# Patient Record
Sex: FEMALE | Race: WHITE | Employment: FULL TIME | URBAN - METROPOLITAN AREA
[De-identification: names, ages, dates, MRNs, and addresses within clinical notes are randomized per-mention and may not be internally consistent; named-entity substitution may affect disease eponyms.]

---

## 2017-02-02 ENCOUNTER — ALLIED HEALTH/NURSE VISIT (OUTPATIENT)
Dept: FAMILY MEDICINE | Facility: OTHER | Age: 47
End: 2017-02-02

## 2017-02-02 VITALS — SYSTOLIC BLOOD PRESSURE: 126 MMHG | DIASTOLIC BLOOD PRESSURE: 80 MMHG | HEART RATE: 87 BPM

## 2017-02-02 DIAGNOSIS — Z01.30 BP CHECK: Primary | ICD-10-CM

## 2017-02-02 NOTE — NURSING NOTE
Lisa Urbina is a 46 year old female who comes in today for a Blood Pressure check because of ongoing blood pressure monitoring.    *Document pulse and BP  *Use new set of vitals button for multiple readings.  *Use extended vitals for orthostatic    Vitals as recorded, a regular cuff was used.    Patient is taking medication as prescribed  Patient is tolerating medications well.  Patient is not monitoring Blood Pressure at home.  Average readings if yes are NA    Current complaints: none - patient did state her ears feel like they are plugged/going to pop.    Disposition: follow-up as indicated by MD/AP

## 2017-02-28 NOTE — PROGRESS NOTES
SUBJECTIVE:                                                    Lisa Urbina is a 46 year old female who presents to clinic today for the following health issues:      History of Present Illness   Hypertension:     Outpatient blood pressures:  Are not being checked    Dietary sodium intake::  No added salt diet      HPI    Concern - Sciatic Nerve     Onset: Last few days    Description:   Sharp pain in sciatic region on right side. Bother's pt when sleeps    Intensity: severe, 7-8/10 @ worse    Progression of Symptoms:  worsening and intermittent    Accompanying Signs & Symptoms:  None       Previous history of similar problem:   Yes, has been seen for this in the past. Requesting a refill of Flexeril, but it okay with not having it if provider doesn't feel comfortable    Precipitating factors:   Worsened by: Laying down    Alleviating factors:  Improved by: Laying on back        Therapies Tried and outcome: Flexeril       Problem list and histories reviewed & adjusted, as indicated.  Additional history: as documented        Patient Active Problem List   Diagnosis     Hypertension goal BP (blood pressure) < 140/90     S/P laparoscopic assisted vaginal hysterectomy (LAVH)     Past Surgical History   Procedure Laterality Date     C thoracentesis,insrt chest tube,ptx       C echo heart xthoracic,complete, w/o doppler  2004     stress echo, normal     Tonsillectomy  05/08/2007     Laparoscopic assisted hysterectomy vaginal  10/14/2013     Procedure: LAPAROSCOPIC ASSISTED HYSTERECTOMY VAGINAL;  Laparoscopic Assisted VAginal Hysterectomy, cystoscopy.;  Surgeon: Elizabeth Arnold MD;  Location: [facility] OR     Cystoscopy  10/14/2013     Procedure: CYSTOSCOPY;;  Surgeon: Elizabeth Arnold MD;  Location: [facility] OR     Hysterectomy, pap no longer indicated         Social History   Substance Use Topics     Smoking status: Former Smoker     Types: Cigarettes     Quit date: 2/23/2012     Smokeless tobacco: Never Used     Alcohol  use Yes      Comment: weekends     Family History   Problem Relation Age of Onset     Hypertension Father      Hypertension Mother      HEART DISEASE Mother      DIABETES Maternal Grandfather      CEREBROVASCULAR DISEASE Maternal Aunt      4 strokes, 1st at age 36     Anesthesia Reaction No family hx of          Current Outpatient Prescriptions   Medication Sig Dispense Refill     lisinopril-hydrochlorothiazide (PRINZIDE/ZESTORETIC) 10-12.5 MG per tablet Take 1 tablet by mouth daily 90 tablet 3     metoprolol (TOPROL XL) 100 MG 24 hr tablet Take 1 tablet (100 mg) by mouth daily In the evening 90 tablet 3     diltiazem 240 MG 24 hr capsule Take 1 capsule (240 mg) by mouth daily 90 capsule 3     cyclobenzaprine (FLEXERIL) 10 MG tablet Take 0.5-1 tablets (5-10 mg) by mouth 3 times daily as needed for muscle spasms 30 tablet 0     [DISCONTINUED] lisinopril-hydrochlorothiazide (PRINZIDE,ZESTORETIC) 10-12.5 MG per tablet Take 1 tablet by mouth daily 90 tablet 3     [DISCONTINUED] metoprolol (TOPROL XL) 100 MG 24 hr tablet Take 1 tablet (100 mg) by mouth daily In the evening 90 tablet 3     [DISCONTINUED] diltiazem 240 MG 24 hr ER capsule Take 1 capsule (240 mg) by mouth daily 90 capsule 3     Allergies   Allergen Reactions     Nkda [No Known Drug Allergies]      BP Readings from Last 3 Encounters:   03/02/17 110/60   02/02/17 126/80   01/20/16 98/66    Wt Readings from Last 3 Encounters:   03/02/17 134 lb (60.8 kg)   01/20/16 146 lb (66.2 kg)   11/30/15 140 lb 6.4 oz (63.7 kg)                  Labs reviewed in EPIC    ROS:  Constitutional, HEENT, cardiovascular, pulmonary, gi and gu systems are negative, except as otherwise noted.    OBJECTIVE:                                                    /60 (BP Location: Left arm, Patient Position: Chair, Cuff Size: Adult Regular)  Pulse 80  Temp 98  F (36.7  C) (Oral)  Wt 134 lb (60.8 kg)  LMP 09/11/2013  BMI 23.18 kg/m2  Body mass index is 23.18 kg/(m^2).  Physical Exam    Constitutional: She is oriented to person, place, and time. She appears well-developed and well-nourished.   HENT:   Head: Normocephalic and atraumatic.   Cardiovascular: Normal rate and regular rhythm.    Pulmonary/Chest: Effort normal and breath sounds normal.   Musculoskeletal:   Neg straight leg rise . Normal Hip exam.No signs of myelopathy   Neurological: She is alert and oriented to person, place, and time.   Psychiatric: She has a normal mood and affect.         Diagnostic Test Results:  none      ASSESSMENT/PLAN:                                                      Problem List Items Addressed This Visit     Hypertension goal BP (blood pressure) < 140/90 - Primary     Blood pressure well controlled on Lisinopril -hctz, diltiazem and metoprolol  Recheck chemistries  Refills provided for 1 yr         Relevant Medications    lisinopril-hydrochlorothiazide (PRINZIDE/ZESTORETIC) 10-12.5 MG per tablet    metoprolol (TOPROL XL) 100 MG 24 hr tablet      Other Visit Diagnoses     Essential hypertension, benign        Relevant Medications    lisinopril-hydrochlorothiazide (PRINZIDE/ZESTORETIC) 10-12.5 MG per tablet    diltiazem 240 MG 24 hr capsule    Other Relevant Orders    Comprehensive metabolic panel (BMP + Alb, Alk Phos, ALT, AST, Total. Bili, TP)    Lipid Profile with reflex to direct LDL    Muscle spasm        Relevant Medications    cyclobenzaprine (FLEXERIL) 10 MG tablet       Back pain - sec to SI joint arthritis vs muscle spasm  Trial cyclobenzaprine  Anti ifnllamtory medications  Advised stretching and exercises   Discussed home care  Reportable signs and symptoms discussed  RTC if symptoms persist or fail to improve    Christen Hernandez MD  [facility]

## 2017-03-02 ENCOUNTER — OFFICE VISIT (OUTPATIENT)
Dept: FAMILY MEDICINE | Facility: OTHER | Age: 47
End: 2017-03-02
Payer: COMMERCIAL

## 2017-03-02 VITALS
DIASTOLIC BLOOD PRESSURE: 60 MMHG | TEMPERATURE: 98 F | WEIGHT: 134 LBS | BODY MASS INDEX: 23.18 KG/M2 | HEART RATE: 80 BPM | SYSTOLIC BLOOD PRESSURE: 110 MMHG

## 2017-03-02 DIAGNOSIS — I10 HYPERTENSION GOAL BP (BLOOD PRESSURE) < 140/90: Primary | ICD-10-CM

## 2017-03-02 DIAGNOSIS — M62.838 MUSCLE SPASM: ICD-10-CM

## 2017-03-02 DIAGNOSIS — I10 ESSENTIAL HYPERTENSION, BENIGN: ICD-10-CM

## 2017-03-02 PROCEDURE — 99214 OFFICE O/P EST MOD 30 MIN: CPT | Performed by: FAMILY MEDICINE

## 2017-03-02 RX ORDER — CYCLOBENZAPRINE HCL 10 MG
5-10 TABLET ORAL 3 TIMES DAILY PRN
Qty: 30 TABLET | Refills: 0 | Status: SHIPPED | OUTPATIENT
Start: 2017-03-02 | End: 2018-04-20

## 2017-03-02 RX ORDER — METOPROLOL SUCCINATE 100 MG/1
100 TABLET, EXTENDED RELEASE ORAL DAILY
Qty: 90 TABLET | Refills: 3 | Status: SHIPPED | OUTPATIENT
Start: 2017-03-02 | End: 2018-04-20

## 2017-03-02 RX ORDER — DILTIAZEM HYDROCHLORIDE 240 MG/1
240 CAPSULE, EXTENDED RELEASE ORAL DAILY
Qty: 90 CAPSULE | Refills: 3 | Status: SHIPPED | OUTPATIENT
Start: 2017-03-02 | End: 2018-04-20

## 2017-03-02 RX ORDER — LISINOPRIL/HYDROCHLOROTHIAZIDE 10-12.5 MG
1 TABLET ORAL DAILY
Qty: 90 TABLET | Refills: 3 | Status: SHIPPED | OUTPATIENT
Start: 2017-03-02 | End: 2018-04-20

## 2017-03-02 ASSESSMENT — PAIN SCALES - GENERAL
PAINLEVEL: MILD PAIN (2)
PAINLEVEL: MILD PAIN (2)

## 2017-03-02 NOTE — NURSING NOTE
"Chief Complaint   Patient presents with     Hypertension     Recheck Medication     Panel Management     height, tdap, flu, pap, bmp       Initial /60 (BP Location: Left arm, Patient Position: Chair, Cuff Size: Adult Regular)  Pulse 80  Temp 98  F (36.7  C) (Oral)  Wt 134 lb (60.8 kg)  LMP 09/11/2013  BMI 23.18 kg/m2 Estimated body mass index is 23.18 kg/(m^2) as calculated from the following:    Height as of 1/20/16: 5' 3.75\" (1.619 m).    Weight as of this encounter: 134 lb (60.8 kg).  Medication Reconciliation: complete   Tay Pereira MA  March 2, 2017      "

## 2017-03-02 NOTE — ASSESSMENT & PLAN NOTE
Blood pressure well controlled on Lisinopril -hctz, diltiazem and metoprolol  Recheck chemistries  Refills provided for 1 yr

## 2017-03-02 NOTE — MR AVS SNAPSHOT
After Visit Summary   3/2/2017    Lisa Urbina    MRN: [id number]           Patient Information     Date Of Birth          1970        Visit Information        Provider Department      3/2/2017 1:20 PM Christen Hernandez MD [facility]        Today's Diagnoses     Muscle spasm    -  1    Screening for malignant neoplasm of cervix        Need for prophylactic vaccination and inoculation against influenza        Need for prophylactic vaccination with tetanus-diphtheria (TD)        Hypertension goal BP (blood pressure) < 140/90        Essential hypertension, benign           Follow-ups after your visit        Who to contact     If you have questions or need follow up information about today's clinic visit or your schedule please contact [facility] directly at [phone].  Normal or non-critical lab and imaging results will be communicated to you by Tiltaphart, letter or phone within 4 business days after the clinic has received the results. If you do not hear from us within 7 days, please contact the clinic through Tiltaphart or phone. If you have a critical or abnormal lab result, we will notify you by phone as soon as possible.  Submit refill requests through [employer] or call your pharmacy and they will forward the refill request to us. Please allow 3 business days for your refill to be completed.          Additional Information About Your Visit        MyChart Information     [employer] gives you secure access to your electronic health record. If you see a primary care provider, you can also send messages to your care team and make appointments. If you have questions, please call your primary care clinic.  If you do not have a primary care provider, please call [phone] and they will assist you.        Care EveryWhere ID     This is your Care EveryWhere ID. This could be used by other organizations to access your [place] medical records  [id number]        Your  Vitals Were     Pulse Temperature Last Period BMI (Body Mass Index)          80 98  F (36.7  C) (Oral) 09/11/2013 23.18 kg/m2         Blood Pressure from Last 3 Encounters:   03/02/17 110/60   02/02/17 126/80   01/20/16 98/66    Weight from Last 3 Encounters:   03/02/17 134 lb (60.8 kg)   01/20/16 146 lb (66.2 kg)   11/30/15 140 lb 6.4 oz (63.7 kg)              We Performed the Following     Comprehensive metabolic panel (BMP + Alb, Alk Phos, ALT, AST, Total. Bili, TP)     Lipid Profile with reflex to direct LDL          Today's Medication Changes          These changes are accurate as of: 3/2/17  2:01 PM.  If you have any questions, ask your nurse or doctor.               Start taking these medicines.        Dose/Directions    cyclobenzaprine 10 MG tablet   Commonly known as:  FLEXERIL   Used for:  Muscle spasm   Started by:  Christen Hernandez MD        Dose:  5-10 mg   Take 0.5-1 tablets (5-10 mg) by mouth 3 times daily as needed for muscle spasms   Quantity:  30 tablet   Refills:  0            Where to get your medicines      These medications were sent to [facility] [address]  [address] [facility] [address]     Phone:  [phone]     cyclobenzaprine 10 MG tablet    diltiazem 240 MG 24 hr capsule    lisinopril-hydrochlorothiazide 10-12.5 MG per tablet    metoprolol 100 MG 24 hr tablet                Primary Care Provider Office Phone # Fax #    Any Perez [phone]       [facility] [address] [facility] [address]        Thank you!     Thank you for choosing [facility]  for your care. Our goal is always to provide you with excellent care. Hearing back from our patients is one way we can continue to improve our services. Please take a few minutes to complete the written survey that you may receive in the mail after your visit with us. Thank you!             Your Updated Medication List - Protect others around you:  Learn how to safely use, store and throw away your medicines at www.disposemymeds.org.          This list is accurate as of: 3/2/17  2:01 PM.  Always use your most recent med list.                   Brand Name Dispense Instructions for use    cyclobenzaprine 10 MG tablet    FLEXERIL    30 tablet    Take 0.5-1 tablets (5-10 mg) by mouth 3 times daily as needed for muscle spasms       diltiazem 240 MG 24 hr capsule     90 capsule    Take 1 capsule (240 mg) by mouth daily       lisinopril-hydrochlorothiazide 10-12.5 MG per tablet    PRINZIDE/ZESTORETIC    90 tablet    Take 1 tablet by mouth daily       metoprolol 100 MG 24 hr tablet    TOPROL XL    90 tablet    Take 1 tablet (100 mg) by mouth daily In the evening

## 2017-03-07 DIAGNOSIS — I10 ESSENTIAL HYPERTENSION, BENIGN: ICD-10-CM

## 2017-03-07 LAB
ALBUMIN SERPL-MCNC: 3.6 G/DL (ref 3.4–5)
ALP SERPL-CCNC: 127 U/L (ref 40–150)
ALT SERPL W P-5'-P-CCNC: 20 U/L (ref 0–50)
ANION GAP SERPL CALCULATED.3IONS-SCNC: 5 MMOL/L (ref 3–14)
AST SERPL W P-5'-P-CCNC: 18 U/L (ref 0–45)
BILIRUB SERPL-MCNC: 0.2 MG/DL (ref 0.2–1.3)
BUN SERPL-MCNC: 12 MG/DL (ref 7–30)
CALCIUM SERPL-MCNC: 8.9 MG/DL (ref 8.5–10.1)
CHLORIDE SERPL-SCNC: 104 MMOL/L (ref 94–109)
CHOLEST SERPL-MCNC: 174 MG/DL
CO2 SERPL-SCNC: 32 MMOL/L (ref 20–32)
CREAT SERPL-MCNC: 0.66 MG/DL (ref 0.52–1.04)
GFR SERPL CREATININE-BSD FRML MDRD: ABNORMAL ML/MIN/1.7M2
GLUCOSE SERPL-MCNC: 100 MG/DL (ref 70–99)
HDLC SERPL-MCNC: 64 MG/DL
LDLC SERPL CALC-MCNC: 88 MG/DL
NONHDLC SERPL-MCNC: 110 MG/DL
POTASSIUM SERPL-SCNC: 3.7 MMOL/L (ref 3.4–5.3)
PROT SERPL-MCNC: 7.2 G/DL (ref 6.8–8.8)
SODIUM SERPL-SCNC: 141 MMOL/L (ref 133–144)
TRIGL SERPL-MCNC: 112 MG/DL

## 2017-03-07 PROCEDURE — 36415 COLL VENOUS BLD VENIPUNCTURE: CPT | Performed by: FAMILY MEDICINE

## 2017-03-07 PROCEDURE — 80061 LIPID PANEL: CPT | Performed by: FAMILY MEDICINE

## 2017-03-07 PROCEDURE — 80053 COMPREHEN METABOLIC PANEL: CPT | Performed by: FAMILY MEDICINE

## 2017-06-07 ENCOUNTER — RADIANT APPOINTMENT (OUTPATIENT)
Dept: MAMMOGRAPHY | Facility: OTHER | Age: 47
End: 2017-06-07
Attending: FAMILY MEDICINE
Payer: COMMERCIAL

## 2017-06-07 DIAGNOSIS — Z12.31 VISIT FOR SCREENING MAMMOGRAM: ICD-10-CM

## 2017-06-07 PROCEDURE — G0202 SCR MAMMO BI INCL CAD: HCPCS | Mod: TC

## 2017-06-08 ENCOUNTER — TELEPHONE (OUTPATIENT)
Dept: FAMILY MEDICINE | Facility: OTHER | Age: 47
End: 2017-06-08

## 2017-06-08 NOTE — TELEPHONE ENCOUNTER
----- Message from Christen Hernandez MD sent at 6/8/2017  6:43 PM CDT -----  Mammogram did not show any suspicious lesions

## 2017-12-20 ENCOUNTER — ALLIED HEALTH/NURSE VISIT (OUTPATIENT)
Dept: FAMILY MEDICINE | Facility: OTHER | Age: 47
End: 2017-12-20
Payer: COMMERCIAL

## 2017-12-20 VITALS — SYSTOLIC BLOOD PRESSURE: 122 MMHG | DIASTOLIC BLOOD PRESSURE: 80 MMHG | HEART RATE: 66 BPM

## 2017-12-20 DIAGNOSIS — Z01.30 BP CHECK: Primary | ICD-10-CM

## 2017-12-20 PROCEDURE — 99207 ZZC NO CHARGE NURSE ONLY: CPT

## 2017-12-20 NOTE — MR AVS SNAPSHOT
After Visit Summary   12/20/2017    Lisa Urbina    MRN: [id number]           Patient Information     Date Of Birth          1970        Visit Information        Provider Department      12/20/2017 2:00 PM JULIO GALEANO TEAM B, [facility]        Today's Diagnoses     BP check    -  1       Follow-ups after your visit        Your next 10 appointments already scheduled     Dec 20, 2017  2:00 PM CST   Nurse Only with NL WALESKA TEAM B, [facility] ([facility])    [address] [facility] [address]  [facility] [id number] [phone]              Who to contact     If you have questions or need follow up information about today's clinic visit or your schedule please contact [facility] directly at [phone].  Normal or non-critical lab and imaging results will be communicated to you by Snootlabhart, letter or phone within 4 business days after the clinic has received the results. If you do not hear from us within 7 days, please contact the clinic through Snootlabhart or phone. If you have a critical or abnormal lab result, we will notify you by phone as soon as possible.  Submit refill requests through Asp[employer] or call your pharmacy and they will forward the refill request to us. Please allow 3 business days for your refill to be completed.          Additional Information About Your Visit        MyChart Information     Asp[employer]ra gives you secure access to your electronic health record. If you see a primary care provider, you can also send messages to your care team and make appointments. If you have questions, please call your primary care clinic.  If you do not have a primary care provider, please call [phone] and they will assist you.        Care EveryWhere ID     This is your Care EveryWhere ID. This could be used by other organizations to access your [place] medical records  [id number]        Your Vitals Were     Pulse Last Period                 66 09/11/2013           Blood Pressure from Last 3 Encounters:   12/20/17 122/80   03/02/17 110/60   02/02/17 126/80    Weight from Last 3 Encounters:   03/02/17 134 lb (60.8 kg)   01/20/16 146 lb (66.2 kg)   11/30/15 140 lb 6.4 oz (63.7 kg)              Today, you had the following     No orders found for display       Primary Care Provider Office Phone # Fax #    Any Perez [phone]       [address] [facility] [address]        Equal Access to Services     [facility]: Hadii valentina sparrow [place], [place], qasariah kaalmada agustin, corby shukla . So [facility] [phone].    ATENCIÓN: Si habla español, tiene a connell disposición servicios gratuitos de asistencia lingüística. Llame al [phone].    We comply with applicable federal civil rights laws and [place] laws. We do not discriminate on the basis of race, color, national origin, age, disability, sex, sexual orientation, or gender identity.            Thank you!     Thank you for choosing [facility]  for your care. Our goal is always to provide you with excellent care. Hearing back from our patients is one way we can continue to improve our services. Please take a few minutes to complete the written survey that you may receive in the mail after your visit with us. Thank you!             Your Updated Medication List - Protect others around you: Learn how to safely use, store and throw away your medicines at www.disposemymeds.org.          This list is accurate as of: 12/20/17  9:27 AM.  Always use your most recent med list.                   Brand Name Dispense Instructions for use Diagnosis    cyclobenzaprine 10 MG tablet    FLEXERIL    30 tablet    Take 0.5-1 tablets (5-10 mg) by mouth 3 times daily as needed for muscle spasms    Muscle spasm       diltiazem 240 MG 24 hr capsule     90 capsule    Take 1 capsule (240 mg) by mouth daily    Essential hypertension, benign        lisinopril-hydrochlorothiazide 10-12.5 MG per tablet    PRINZIDE/ZESTORETIC    90 tablet    Take 1 tablet by mouth daily    Hypertension goal BP (blood pressure) < 140/90       metoprolol 100 MG 24 hr tablet    TOPROL XL    90 tablet    Take 1 tablet (100 mg) by mouth daily In the evening    Hypertension goal BP (blood pressure) < 140/90

## 2017-12-20 NOTE — NURSING NOTE
Lisa Urbina is a 47 year old female who comes in today for a Blood Pressure check because of ongoing blood pressure monitoring.    *Document pulse and BP  *Use new set of vitals button for multiple readings.  *Use extended vitals for orthostatic    Vitals as recorded, a regular cuff was used.    Patient is taking medication as prescribed  Patient is tolerating medications well.  Patient is not monitoring Blood Pressure at home.      Current complaints: headaches    Disposition: follow-up as indicated by MD/AP    Huddled with RN due to symptom of headache and they will follow up with patient.

## 2018-04-18 ASSESSMENT — ENCOUNTER SYMPTOMS
FREQUENCY: 0
EYE PAIN: 0
CONSTIPATION: 0
CHILLS: 0
HEADACHES: 0
NERVOUS/ANXIOUS: 0
SHORTNESS OF BREATH: 0
HEARTBURN: 0
MYALGIAS: 0
DIARRHEA: 0
DIZZINESS: 0
ARTHRALGIAS: 0
HEMATURIA: 0
FEVER: 0
SORE THROAT: 0
COUGH: 0
ABDOMINAL PAIN: 0
JOINT SWELLING: 0
DYSURIA: 0
HEMATOCHEZIA: 0
WEAKNESS: 0
NAUSEA: 0
PALPITATIONS: 0
PARESTHESIAS: 0

## 2018-04-18 NOTE — PATIENT INSTRUCTIONS
Preventive Health Recommendations  Female Ages 40 to 49    Yearly exam:     See your health care provider every year in order to  1. Review health changes.   2. Discuss preventive care.    3. Review your medicines if your doctor prescribed any.      Get a Pap test every three years (unless you have an abnormal result and your provider advises testing more often).      If you get Pap tests with HPV test, you only need to test every 5 years, unless you have an abnormal result. You do not need a Pap test if your uterus was removed (hysterectomy) and you have not had cancer.      You should be tested each year for STDs (sexually transmitted diseases), if you're at risk.       Ask your doctor if you should have a mammogram.      Have a colonoscopy (test for colon cancer) if someone in your family has had colon cancer or polyps before age 50.       Have a cholesterol test every 5 years.       Have a diabetes test (fasting glucose) after age 45. If you are at risk for diabetes, you should have this test every 3 years.    Shots: Get a flu shot each year. Get a tetanus shot every 10 years.     Nutrition:     Eat at least 5 servings of fruits and vegetables each day.    Eat whole-grain bread, whole-wheat pasta and brown rice instead of white grains and rice.    Talk to your provider about Calcium and Vitamin D.     Lifestyle    Exercise at least 150 minutes a week (an average of 30 minutes a day, 5 days a week). This will help you control your weight and prevent disease.    Limit alcohol to one drink per day.    No smoking.     Wear sunscreen to prevent skin cancer.    See your dentist every six months for an exam and cleaning.    Please follow up with [facility] and mammo.     I will my chart you  Result    Thank you  Staci Bustos CNP
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
## 2018-04-20 ENCOUNTER — OFFICE VISIT (OUTPATIENT)
Dept: FAMILY MEDICINE | Facility: OTHER | Age: 48
End: 2018-04-20
Payer: COMMERCIAL

## 2018-04-20 VITALS
WEIGHT: 136 LBS | DIASTOLIC BLOOD PRESSURE: 72 MMHG | HEIGHT: 64 IN | SYSTOLIC BLOOD PRESSURE: 120 MMHG | HEART RATE: 72 BPM | TEMPERATURE: 98.1 F | RESPIRATION RATE: 16 BRPM | BODY MASS INDEX: 23.22 KG/M2

## 2018-04-20 DIAGNOSIS — Z01.00 VISIT FOR EYE AND VISION EXAM: ICD-10-CM

## 2018-04-20 DIAGNOSIS — Z13.220 NEED FOR LIPID SCREENING: ICD-10-CM

## 2018-04-20 DIAGNOSIS — Z23 NEED FOR VACCINATION: ICD-10-CM

## 2018-04-20 DIAGNOSIS — I10 ESSENTIAL HYPERTENSION, BENIGN: ICD-10-CM

## 2018-04-20 DIAGNOSIS — Z00.00 ROUTINE GENERAL MEDICAL EXAMINATION AT A HEALTH CARE FACILITY: Primary | ICD-10-CM

## 2018-04-20 DIAGNOSIS — M62.838 MUSCLE SPASM: ICD-10-CM

## 2018-04-20 LAB
CHOLEST SERPL-MCNC: 186 MG/DL
HDLC SERPL-MCNC: 55 MG/DL
LDLC SERPL CALC-MCNC: 99 MG/DL
NONHDLC SERPL-MCNC: 131 MG/DL
TRIGL SERPL-MCNC: 160 MG/DL

## 2018-04-20 PROCEDURE — 80061 LIPID PANEL: CPT | Performed by: NURSE PRACTITIONER

## 2018-04-20 PROCEDURE — 99396 PREV VISIT EST AGE 40-64: CPT | Performed by: NURSE PRACTITIONER

## 2018-04-20 PROCEDURE — 36415 COLL VENOUS BLD VENIPUNCTURE: CPT | Performed by: NURSE PRACTITIONER

## 2018-04-20 RX ORDER — METOPROLOL SUCCINATE 100 MG/1
100 TABLET, EXTENDED RELEASE ORAL DAILY
Qty: 90 TABLET | Refills: 3 | Status: SHIPPED | OUTPATIENT
Start: 2018-04-20 | End: 2019-03-22

## 2018-04-20 RX ORDER — DILTIAZEM HYDROCHLORIDE 240 MG/1
240 CAPSULE, EXTENDED RELEASE ORAL DAILY
Qty: 90 CAPSULE | Refills: 3 | Status: SHIPPED | OUTPATIENT
Start: 2018-04-20 | End: 2019-03-22

## 2018-04-20 RX ORDER — CYCLOBENZAPRINE HCL 10 MG
5-10 TABLET ORAL 3 TIMES DAILY PRN
Qty: 30 TABLET | Refills: 0 | Status: SHIPPED | OUTPATIENT
Start: 2018-04-20 | End: 2020-07-13

## 2018-04-20 RX ORDER — LISINOPRIL/HYDROCHLOROTHIAZIDE 10-12.5 MG
1 TABLET ORAL DAILY
Qty: 90 TABLET | Refills: 3 | Status: SHIPPED | OUTPATIENT
Start: 2018-04-20 | End: 2019-03-22

## 2018-04-20 ASSESSMENT — ENCOUNTER SYMPTOMS
SHORTNESS OF BREATH: 0
SORE THROAT: 0
JOINT SWELLING: 0
PALPITATIONS: 0
CONSTIPATION: 0
FREQUENCY: 0
EYE PAIN: 0
NAUSEA: 0
MYALGIAS: 0
DIZZINESS: 0
PARESTHESIAS: 0
WEAKNESS: 0
HEARTBURN: 0
CHILLS: 0
ARTHRALGIAS: 0
DIARRHEA: 0
COUGH: 0
HEADACHES: 0
FEVER: 0
HEMATOCHEZIA: 0
HEMATURIA: 0
NERVOUS/ANXIOUS: 0
ABDOMINAL PAIN: 0
DYSURIA: 0

## 2018-04-20 NOTE — MR AVS SNAPSHOT
After Visit Summary   4/20/2018    Lisa Urbina    MRN: [id number]           Patient Information     Date Of Birth          1970        Visit Information        Provider Department      4/20/2018 12:00 PM Staci Bustos APRN [facility]        Today's Diagnoses     Routine general medical examination at a health care facility    -  1    Essential hypertension        Muscle spasm        Essential hypertension, benign        Hypertension goal BP (blood pressure) < 140/90        Need for vaccination        Need for lipid screening        Visit for eye and vision exam          Care Instructions      Preventive Health Recommendations  Female Ages 40 to 49    Yearly exam:     See your health care provider every year in order to  1. Review health changes.   2. Discuss preventive care.    3. Review your medicines if your doctor prescribed any.      Get a Pap test every three years (unless you have an abnormal result and your provider advises testing more often).      If you get Pap tests with HPV test, you only need to test every 5 years, unless you have an abnormal result. You do not need a Pap test if your uterus was removed (hysterectomy) and you have not had cancer.      You should be tested each year for STDs (sexually transmitted diseases), if you're at risk.       Ask your doctor if you should have a mammogram.      Have a colonoscopy (test for colon cancer) if someone in your family has had colon cancer or polyps before age 50.       Have a cholesterol test every 5 years.       Have a diabetes test (fasting glucose) after age 45. If you are at risk for diabetes, you should have this test every 3 years.    Shots: Get a flu shot each year. Get a tetanus shot every 10 years.     Nutrition:     Eat at least 5 servings of fruits and vegetables each day.    Eat whole-grain bread, whole-wheat pasta and brown rice instead of white grains and rice.    Talk to your  provider about Calcium and Vitamin D.     Lifestyle    Exercise at least 150 minutes a week (an average of 30 minutes a day, 5 days a week). This will help you control your weight and prevent disease.    Limit alcohol to one drink per day.    No smoking.     Wear sunscreen to prevent skin cancer.    See your dentist every six months for an exam and cleaning.    Please follow up with [facility].     I will my chart you  Result    Thank you  Staci Bustos CNP            Follow-ups after your visit        Additional Services     OPHTHALMOLOGY ADULT REFERRAL       Your provider has referred you to: N: [facility] - [place] [phone]  [email or web address]    Please be aware that coverage of these services is subject to the terms and limitations of your health insurance plan.  Call member services at your health plan with any benefit or coverage questions.      Please bring the following with you to your appointment:    (1) Any X-Rays, CTs or MRIs which have been performed.  Contact the facility where they were done to arrange for  prior to your scheduled appointment.    (2) List of current medications  (3) This referral request   (4) Any documents/labs given to you for this referral                  Your next 10 appointments already scheduled     Jun 14, 2018 11:45 AM CDT   (Arrive by 11:30 AM)   [facility] SCREENING DIG[facility] BIL[facility]AL [facility]    [address] [address] [phone]           Do not use any powder, lotion or deodorant under your arms or on your breast. If you do, we will ask you to remove it before your exam.  Wear comfortable, two-piece clothing.  If you have any allergies, tell your care team.  Bring any previous mammograms from other facilities or have them mailed to the breast center.              Future tests that were ordered for you today     Open Future Orders         "Priority Expected Expires Ordered    Basic metabolic panel Routine  7/19/2018 4/20/2018            Who to contact     If you have questions or need follow up information about today's clinic visit or your schedule please contact [facility] [place] directly at [phone].  Normal or non-critical lab and imaging results will be communicated to you by MyChart, letter or phone within 4 business days after the clinic has received the results. If you do not hear from us within 7 days, please contact the clinic through MyChart or phone. If you have a critical or abnormal lab result, we will notify you by phone as soon as possible.  Submit refill requests through [facility] or call your pharmacy and they will forward the refill request to us. Please allow 3 business days for your refill to be completed.          Additional Information About Your Visit        MyChart Information     [facility] gives you secure access to your electronic health record. If you see a primary care provider, you can also send messages to your care team and make appointments. If you have questions, please call your primary care clinic.  If you do not have a primary care provider, please call [phone] and they will assist you.        Care EveryWhere ID     This is your Care EveryWhere ID. This could be used by other organizations to access your [facility] medical records  [id number]        Your Vitals Were     Pulse Temperature Respirations Height Last Period BMI (Body Mass Index)    72 98.1  F (36.7  C) (Oral) 16 5' 4.17\" (1.63 m) 09/11/2013 23.22 kg/m2       Blood Pressure from Last 3 Encounters:   04/20/18 120/72   12/20/17 122/80   03/02/17 110/60    Weight from Last 3 Encounters:   04/20/18 136 lb (61.7 kg)   03/02/17 134 lb (60.8 kg)   01/20/16 146 lb (66.2 kg)              We Performed the Following     Lipid Profile (Chol, Trig, HDL, LDL calc)     OPHTHALMOLOGY ADULT REFERRAL     TDAP VACCINE (ADACEL)          Where to get your " medicines      These medications were sent [place] [facility] [place] - [address]  [address], [place] [address]     Phone:  [phone]     cyclobenzaprine 10 MG tablet    diltiazem 240 MG 24 hr capsule    lisinopril-hydrochlorothiazide 10-12.5 MG per tablet    metoprolol succinate 100 MG 24 hr tablet          Primary Care Provider Fax #    Physician No Ref-Primary [phone]       No address on file        Equal Access to Services     PARISH ALLEN : Hadii aad ku hadasho Soomaali, waaxda luqadaha, qaybta kaalmada adeegyada, waxay idiin hayaan louisa mendoza. So [facility] [place] [phone].    ATENCIÓN: Si habla español, tiene a connell disposición servicios gratuitos de asistencia lingüística. [facility] [phone].    We comply with applicable federal civil rights laws and [place] laws. We do not discriminate on the basis of race, color, national origin, age, disability, sex, sexual orientation, or gender identity.            Thank you!     Thank you for choosing [facility]  for your care. Our goal is always to provide you with excellent care. Hearing back from our patients is one way we can continue to improve our services. Please take a few minutes to complete the written survey that you may receive in the mail after your visit with us. Thank you!             Your Updated Medication List - Protect others around you: Learn how to safely use, store and throw away your medicines at www.disposemymeds.org.          This list is accurate as of 4/20/18 12:28 PM.  Always use your most recent med list.                   Brand Name Dispense Instructions for use Diagnosis    cyclobenzaprine 10 MG tablet    FLEXERIL    30 tablet    Take 0.5-1 tablets (5-10 mg) by mouth 3 times daily as needed for muscle spasms    Muscle spasm       diltiazem 240 MG 24 hr capsule     90 capsule    Take 1 capsule (240 mg) by mouth daily    Essential hypertension, benign        lisinopril-hydrochlorothiazide 10-12.5 MG per tablet    PRINZIDE/ZESTORETIC    90 tablet    Take 1 tablet by mouth daily    Hypertension goal BP (blood pressure) < 140/90       metoprolol succinate 100 MG 24 hr tablet    TOPROL XL    90 tablet    Take 1 tablet (100 mg) by mouth daily In the evening    Hypertension goal BP (blood pressure) < 140/90

## 2018-04-23 NOTE — PROGRESS NOTES
Please advise ANITA Mtz 1970,   The results of your recent lipid (cholesterol) profile were abnormal.    Here are the results:  Lab Results       Component                Value               Date                       CHOL                     186                 2018            Lab Results       Component                Value               Date                       HDL                      55                  2018            Lab Results       Component                Value               Date                       LDL                      99                  2018            Lab Results       Component                Value               Date                       TRIG                     160                 2018            Lab Results       Component                Value               Date                       CHOLHDLRATIO             4.0                 2014              Desired or goal levels are:  CHOLESTEROL: Desirable is less than 200.   HDL (Good Cholesterol): Desirable is greater than 40 (for men) greater than 50 (for women).  LDL (Bad Cholesterol): Desirable is less than 130 (or less than 100 if you have heart disease or diabetes). Borderline 130-160.  TRIGLYCERIDES: Desirable is less than 150.  Borderline is 150-200.    I recommend that you take Omega-3 fatty acids (available in capsules to improve your triglycerides. Please begin with 1000 mg daily of EPA + DHA..  For high triglycerides, reduce the intake of jam, jelly, honey, alcohol, and/or coffee creamers.    As you may know, an elevated cholesterol is one factor that increases your risk for heart disease and stroke. You can improve your cholesterol by controlling the amount and type of fat you eat and by increasing your daily activity level.    Here are some ways to improve your nutrition:  Eat less fat (especially butter, Crisco and other saturated fats)  Buy lean cuts of meat, reduce your portions of red  meat or substitute poultry or fish  Use skim milk and low-fat dairy products  Eat no more than 4 egg yolks per week  Avoid fried or fast foods that are high in fat  Eat more fruits and vegetables      [phone] (home) [phone] (work)  Thank you  Staci SIMON-CNP

## 2018-04-24 ENCOUNTER — TELEPHONE (OUTPATIENT)
Dept: FAMILY MEDICINE | Facility: OTHER | Age: 48
End: 2018-04-24

## 2018-04-24 DIAGNOSIS — I10 ESSENTIAL HYPERTENSION, BENIGN: ICD-10-CM

## 2018-04-24 LAB
ANION GAP SERPL CALCULATED.3IONS-SCNC: 8 MMOL/L (ref 3–14)
BUN SERPL-MCNC: 14 MG/DL (ref 7–30)
CALCIUM SERPL-MCNC: 9 MG/DL (ref 8.5–10.1)
CHLORIDE SERPL-SCNC: 102 MMOL/L (ref 94–109)
CO2 SERPL-SCNC: 29 MMOL/L (ref 20–32)
CREAT SERPL-MCNC: 0.76 MG/DL (ref 0.52–1.04)
GFR SERPL CREATININE-BSD FRML MDRD: 81 ML/MIN/1.7M2
GLUCOSE SERPL-MCNC: 90 MG/DL (ref 70–99)
POTASSIUM SERPL-SCNC: 4 MMOL/L (ref 3.4–5.3)
SODIUM SERPL-SCNC: 139 MMOL/L (ref 133–144)

## 2018-04-24 PROCEDURE — 80048 BASIC METABOLIC PNL TOTAL CA: CPT | Performed by: NURSE PRACTITIONER

## 2018-04-24 PROCEDURE — 36415 COLL VENOUS BLD VENIPUNCTURE: CPT | Performed by: NURSE PRACTITIONER

## 2018-04-24 NOTE — TELEPHONE ENCOUNTER
Reason for call:  Results  Reason for Call:  Request for results:    Name of test or procedure: lab work     Date of test of procedure: 4/20/18    Location of the test or procedure: [place]    OK to leave the result message on voice mail or with a family member? YES    Phone number Patient can be reached at:  Cell number on file:    Telephone Information:   Mobile [phone]       Additional comments: Pt had received lipid results thru "[employer] but is wondering what he other test results are. ( BMP ) Please contact pt. She is working upfront today at the [place] location or message thru "[employer]". Thank you!    Call taken on 4/24/2018 at 10:54 AM by Kerri Santacruz

## 2018-04-24 NOTE — TELEPHONE ENCOUNTER
Spoke to patient and informed that the BMP was in as future. On 4/20/18. Lab did not draw that one.

## 2018-04-26 ENCOUNTER — TELEPHONE (OUTPATIENT)
Dept: FAMILY MEDICINE | Facility: OTHER | Age: 48
End: 2018-04-26

## 2018-04-26 NOTE — TELEPHONE ENCOUNTER
Notes Recorded by Elizabeth Ascencio CMA on 2018 at 10:22 AM  Called patient and left a voicemail to call clinic back.     Elizabeth Ascencio MA     ------    Notes Recorded by Staci Bustos APRN CNP on 2018 at 8:31 AM  Please advise Lisa Urbina, ANITA 1970, that her lab results were normal electrolytes. Normal kidney function.   [phone] (home) [phone] (work)    Thank you

## 2018-04-26 NOTE — PROGRESS NOTES
Please advise Lisa Urbina,  1970, that her lab results were normal electrolytes. Normal kidney function.   [phone] (home) [phone] (work)    Thank you  Staci Bustos CNP

## 2018-05-30 ENCOUNTER — TELEPHONE (OUTPATIENT)
Dept: FAMILY MEDICINE | Facility: OTHER | Age: 48
End: 2018-05-30

## 2018-05-30 NOTE — TELEPHONE ENCOUNTER
You placed a referral to [facility]  on 4/20/18.    It is unclear if the patient has scheduled yet, not finding a report showing they were seen.     Please forward to your team if further follow up is needed to see if they have made this appointment.      Thank you!   Starr/Referral Representative for [facility]

## 2018-06-14 ENCOUNTER — RADIANT APPOINTMENT (OUTPATIENT)
Dept: MAMMOGRAPHY | Facility: OTHER | Age: 48
End: 2018-06-14
Payer: COMMERCIAL

## 2018-06-14 DIAGNOSIS — Z12.31 VISIT FOR SCREENING MAMMOGRAM: ICD-10-CM

## 2018-06-14 PROCEDURE — 77067 SCR MAMMO BI INCL CAD: CPT | Mod: TC

## 2019-03-20 NOTE — PROGRESS NOTES
SUBJECTIVE:   Lisa Urbina is a 48 year old female who presents to clinic today for the following health issues:      HPI  Possible Gallbladder pain   Onset: atleast a month     Description:   Not hungry but feeling nauseous. Stomach is always upset (kind of gnawing, burning pain)    Intensity: moderate    Progression of Symptoms:  worsening     Accompanying Signs & Symptoms:  Stool is yellow or green always  burping up bile   *    Therapies Tried and outcome: tried nexium and zantac. States she was having severe heart burn she tried otc as noted and states the heart burn symptoms improved.   Now she is feeling nauseated and epigastric. She states symptoms are present when eating and not eating.   States she is not vomiting is nauseated. Symptoms are present all the time day and night.   BM have been green/tan/yellow soft normal constancy this started 1-2 weeks ago.     She states symptoms do not wax and wane. Family h/o GB disease. Does not drink a lot of caffeine.   Eats balanced diet.   Does not take Nsaids.   She is not a smoker      Problem list and histories reviewed & adjusted, as indicated.  Additional history: as documented    Patient Active Problem List   Diagnosis     Essential hypertension     S/P laparoscopic assisted vaginal hysterectomy (LAVH)     Astigmatism     Past Surgical History:   Procedure Laterality Date     C ECHO HEART XTHORACIC,COMPLETE, W/O DOPPLER  2004    stress echo, normal     C THORACENTESIS,INSRT CHEST TUBE,PTX       CYSTOSCOPY  10/14/2013    Procedure: CYSTOSCOPY;;  Surgeon: Elizabeth Arnold MD;  Location:  OR     HYSTERECTOMY, PAP NO LONGER INDICATED       LAPAROSCOPIC ASSISTED HYSTERECTOMY VAGINAL  10/14/2013    Procedure: LAPAROSCOPIC ASSISTED HYSTERECTOMY VAGINAL;  Laparoscopic Assisted VAginal Hysterectomy, cystoscopy.;  Surgeon: Elizabeth Arnold MD;  Location:  OR     TONSILLECTOMY  05/08/2007       Social History     Tobacco Use     Smoking status: Former  Smoker     Types: Cigarettes     Last attempt to quit: 2012     Years since quittin.0     Smokeless tobacco: Never Used   Substance Use Topics     Alcohol use: Yes     Comment: weekends     Family History   Problem Relation Age of Onset     Hypertension Father      Hypertension Mother      Heart Disease Mother      Diabetes Maternal Grandfather      Cerebrovascular Disease Maternal Aunt         4 strokes, 1st at age 36     Anesthesia Reaction No family hx of          Current Outpatient Medications   Medication Sig Dispense Refill     cyclobenzaprine (FLEXERIL) 10 MG tablet Take 0.5-1 tablets (5-10 mg) by mouth 3 times daily as needed for muscle spasms 30 tablet 0     diltiazem ER (DILT-XR) 240 MG 24 hr ER beaded capsule Take 1 capsule (240 mg) by mouth daily 90 capsule 3     lisinopril-hydrochlorothiazide (PRINZIDE/ZESTORETIC) 10-12.5 MG tablet Take 1 tablet by mouth daily 90 tablet 3     metoprolol succinate ER (TOPROL XL) 100 MG 24 hr tablet Take 1 tablet (100 mg) by mouth daily In the evening 90 tablet 3     omeprazole (PRILOSEC) 40 MG DR capsule Take 1 capsule (40 mg) by mouth daily 60 capsule 0     Allergies   Allergen Reactions     Nkda [No Known Drug Allergies]      BP Readings from Last 3 Encounters:   19 118/70   18 120/72   17 122/80    Wt Readings from Last 3 Encounters:   19 63 kg (139 lb)   18 61.7 kg (136 lb)   17 60.8 kg (134 lb)                  Labs reviewed in EPIC    ROS:  Constitutional, HEENT, cardiovascular, pulmonary, GI, , musculoskeletal, neuro, skin, endocrine and psych systems are negative, except as otherwise noted.    OBJECTIVE:     /70   Pulse 74   Temp 98.1  F (36.7  C) (Temporal)   Resp 16   Wt 63 kg (139 lb)   LMP 2013   SpO2 97%   BMI 23.73 kg/m    Body mass index is 23.73 kg/m .  GENERAL: healthy, alert and no distress  NECK: no adenopathy, no asymmetry, masses, or scars and thyroid normal to palpation  RESP: lungs  clear to auscultation - no rales, rhonchi or wheezes  CV: regular rate and rhythm, normal S1 S2, no S3 or S4, no murmur, click or rub, no peripheral edema and peripheral pulses strong  ABDOMEN: tenderness epigastric and RUQ, no organomegaly or masses, liver span normal to percussion, bowel sounds normal, no palpable or pulsatile masses, no bruits heard and no palpable renal abnormalities   MS: no gross musculoskeletal defects noted, no edema  SKIN: no suspicious lesions or rashes  NEURO: Normal strength and tone, mentation intact and speech normal  PSYCH: mentation appears normal, affect normal/bright    ASSESSMENT/PLAN:       1. Right upper quadrant pain  Pain with light palpation RUQ pain in epigastric.   - US Abdomen Limited; Future  - **ESR FUTURE anytime; Future  - CBC with platelets and differential; Future  - Amylase; Future  - **Comprehensive metabolic panel FUTURE anytime; Future  - Lipase; Future    2. Epigastric pain    - US Abdomen Limited; Future  - omeprazole (PRILOSEC) 40 MG DR capsule; Take 1 capsule (40 mg) by mouth daily  Dispense: 60 capsule; Refill: 0  - **ESR FUTURE anytime; Future  - CBC with platelets and differential; Future  - Amylase; Future  - **Comprehensive metabolic panel FUTURE anytime; Future  - Lipase; Future    3. Special screening for malignant neoplasms, colon  Due for colonoscopy recommend having this completed  - GASTROENTEROLOGY ADULT REF PROCEDURE ONLY [facility] [phone]    4. Gastroesophageal reflux disease with esophagitis  Recommend daily PPI to help control symptoms Side effects of medications, proper use, the associated risk/benefits and other options were discussed. Patient understands these risks and agrees to take the medication as instructed.     - omeprazole (PRILOSEC) 40 MG DR capsule; Take 1 capsule (40 mg) by mouth daily  Dispense: 60 capsule; Refill: 0    5. Essential hypertension, benign  Refills given she is due denies s/e   - diltiazem ER  (DILT-XR) 240 MG 24 hr ER beaded capsule; Take 1 capsule (240 mg) by mouth daily  Dispense: 90 capsule; Refill: 3  - lisinopril-hydrochlorothiazide (PRINZIDE/ZESTORETIC) 10-12.5 MG tablet; Take 1 tablet by mouth daily  Dispense: 90 tablet; Refill: 3  - metoprolol succinate ER (TOPROL XL) 100 MG 24 hr tablet; Take 1 tablet (100 mg) by mouth daily In the evening  Dispense: 90 tablet; Refill: 3    Patient Instructions   Prilosec as discussed.   Follow up with ultra sound.   I will call you with lab results.   Follow up in clinic in 4 weeks.     Thank you  ALFRED Quiñonez CNP [facility]

## 2019-03-20 NOTE — H&P (VIEW-ONLY)
SUBJECTIVE:   Lisa Urbina is a 48 year old female who presents to clinic today for the following health issues:      HPI  Possible Gallbladder pain   Onset: atleast a month     Description:   Not hungry but feeling nauseous. Stomach is always upset (kind of gnawing, burning pain)    Intensity: moderate    Progression of Symptoms:  worsening     Accompanying Signs & Symptoms:  Stool is yellow or green always  burping up bile   *    Therapies Tried and outcome: tried nexium and zantac. States she was having severe heart burn she tried otc as noted and states the heart burn symptoms improved.   Now she is feeling nauseated and epigastric. She states symptoms are present when eating and not eating.   States she is not vomiting is nauseated. Symptoms are present all the time day and night.   BM have been green/tan/yellow soft normal constancy this started 1-2 weeks ago.     She states symptoms do not wax and wane. Family h/o GB disease. Does not drink a lot of caffeine.   Eats balanced diet.   Does not take Nsaids.   She is not a smoker      Problem list and histories reviewed & adjusted, as indicated.  Additional history: as documented    Patient Active Problem List   Diagnosis     Essential hypertension     S/P laparoscopic assisted vaginal hysterectomy (LAVH)     Astigmatism     Past Surgical History:   Procedure Laterality Date     C ECHO HEART XTHORACIC,COMPLETE, W/O DOPPLER  2004    stress echo, normal     C THORACENTESIS,INSRT CHEST TUBE,PTX       CYSTOSCOPY  10/14/2013    Procedure: CYSTOSCOPY;;  Surgeon: Elizabeth Arnold MD;  Location:  OR     HYSTERECTOMY, PAP NO LONGER INDICATED       LAPAROSCOPIC ASSISTED HYSTERECTOMY VAGINAL  10/14/2013    Procedure: LAPAROSCOPIC ASSISTED HYSTERECTOMY VAGINAL;  Laparoscopic Assisted VAginal Hysterectomy, cystoscopy.;  Surgeon: Elizabeth Arnold MD;  Location:  OR     TONSILLECTOMY  05/08/2007       Social History     Tobacco Use     Smoking status: Former  Smoker     Types: Cigarettes     Last attempt to quit: 2012     Years since quittin.0     Smokeless tobacco: Never Used   Substance Use Topics     Alcohol use: Yes     Comment: weekends     Family History   Problem Relation Age of Onset     Hypertension Father      Hypertension Mother      Heart Disease Mother      Diabetes Maternal Grandfather      Cerebrovascular Disease Maternal Aunt         4 strokes, 1st at age 36     Anesthesia Reaction No family hx of          Current Outpatient Medications   Medication Sig Dispense Refill     cyclobenzaprine (FLEXERIL) 10 MG tablet Take 0.5-1 tablets (5-10 mg) by mouth 3 times daily as needed for muscle spasms 30 tablet 0     diltiazem ER (DILT-XR) 240 MG 24 hr ER beaded capsule Take 1 capsule (240 mg) by mouth daily 90 capsule 3     lisinopril-hydrochlorothiazide (PRINZIDE/ZESTORETIC) 10-12.5 MG tablet Take 1 tablet by mouth daily 90 tablet 3     metoprolol succinate ER (TOPROL XL) 100 MG 24 hr tablet Take 1 tablet (100 mg) by mouth daily In the evening 90 tablet 3     omeprazole (PRILOSEC) 40 MG DR capsule Take 1 capsule (40 mg) by mouth daily 60 capsule 0     Allergies   Allergen Reactions     Nkda [No Known Drug Allergies]      BP Readings from Last 3 Encounters:   19 118/70   18 120/72   17 122/80    Wt Readings from Last 3 Encounters:   19 63 kg (139 lb)   18 61.7 kg (136 lb)   17 60.8 kg (134 lb)                  Labs reviewed in EPIC    ROS:  Constitutional, HEENT, cardiovascular, pulmonary, GI, , musculoskeletal, neuro, skin, endocrine and psych systems are negative, except as otherwise noted.    OBJECTIVE:     /70   Pulse 74   Temp 98.1  F (36.7  C) (Temporal)   Resp 16   Wt 63 kg (139 lb)   LMP 2013   SpO2 97%   BMI 23.73 kg/m    Body mass index is 23.73 kg/m .  GENERAL: healthy, alert and no distress  NECK: no adenopathy, no asymmetry, masses, or scars and thyroid normal to palpation  RESP: lungs  clear to auscultation - no rales, rhonchi or wheezes  CV: regular rate and rhythm, normal S1 S2, no S3 or S4, no murmur, click or rub, no peripheral edema and peripheral pulses strong  ABDOMEN: tenderness epigastric and RUQ, no organomegaly or masses, liver span normal to percussion, bowel sounds normal, no palpable or pulsatile masses, no bruits heard and no palpable renal abnormalities   MS: no gross musculoskeletal defects noted, no edema  SKIN: no suspicious lesions or rashes  NEURO: Normal strength and tone, mentation intact and speech normal  PSYCH: mentation appears normal, affect normal/bright    ASSESSMENT/PLAN:       1. Right upper quadrant pain  Pain with light palpation RUQ pain in epigastric.   - US Abdomen Limited; Future  - **ESR FUTURE anytime; Future  - CBC with platelets and differential; Future  - Amylase; Future  - **Comprehensive metabolic panel FUTURE anytime; Future  - Lipase; Future    2. Epigastric pain    - US Abdomen Limited; Future  - omeprazole (PRILOSEC) 40 MG DR capsule; Take 1 capsule (40 mg) by mouth daily  Dispense: 60 capsule; Refill: 0  - **ESR FUTURE anytime; Future  - CBC with platelets and differential; Future  - Amylase; Future  - **Comprehensive metabolic panel FUTURE anytime; Future  - Lipase; Future    3. Special screening for malignant neoplasms, colon  Due for colonoscopy recommend having this completed  - GASTROENTEROLOGY ADULT REF PROCEDURE ONLY [facility] [place][note 2] [phone]    4. Gastroesophageal reflux disease with esophagitis  Recommend daily PPI to help control symptoms Side effects of medications, proper use, the associated risk/benefits and other options were discussed. Patient understands these risks and agrees to take the medication as instructed.     - omeprazole (PRILOSEC) 40 MG DR capsule; Take 1 capsule (40 mg) by mouth daily  Dispense: 60 capsule; Refill: 0    5. Essential hypertension, benign  Refills given she is due denies s/e   - diltiazem ER  (DILT-XR) 240 MG 24 hr ER beaded capsule; Take 1 capsule (240 mg) by mouth daily  Dispense: 90 capsule; Refill: 3  - lisinopril-hydrochlorothiazide (PRINZIDE/ZESTORETIC) 10-12.5 MG tablet; Take 1 tablet by mouth daily  Dispense: 90 tablet; Refill: 3  - metoprolol succinate ER (TOPROL XL) 100 MG 24 hr tablet; Take 1 tablet (100 mg) by mouth daily In the evening  Dispense: 90 tablet; Refill: 3    Patient Instructions   Prilosec as discussed.   Follow up with ultra sound.   I will call you with lab results.   Follow up in clinic in 4 weeks.     Thank you  ALFRED Quiñonez CNP [facility]

## 2019-03-22 ENCOUNTER — OFFICE VISIT (OUTPATIENT)
Dept: FAMILY MEDICINE | Facility: OTHER | Age: 49
End: 2019-03-22
Payer: COMMERCIAL

## 2019-03-22 VITALS
OXYGEN SATURATION: 97 % | DIASTOLIC BLOOD PRESSURE: 70 MMHG | RESPIRATION RATE: 16 BRPM | TEMPERATURE: 98.1 F | HEART RATE: 74 BPM | BODY MASS INDEX: 23.73 KG/M2 | SYSTOLIC BLOOD PRESSURE: 118 MMHG | WEIGHT: 139 LBS

## 2019-03-22 DIAGNOSIS — R10.11 RIGHT UPPER QUADRANT PAIN: ICD-10-CM

## 2019-03-22 DIAGNOSIS — R10.13 EPIGASTRIC PAIN: ICD-10-CM

## 2019-03-22 DIAGNOSIS — K21.00 GASTROESOPHAGEAL REFLUX DISEASE WITH ESOPHAGITIS: ICD-10-CM

## 2019-03-22 DIAGNOSIS — Z12.11 SPECIAL SCREENING FOR MALIGNANT NEOPLASMS, COLON: ICD-10-CM

## 2019-03-22 DIAGNOSIS — R10.11 RIGHT UPPER QUADRANT PAIN: Primary | ICD-10-CM

## 2019-03-22 DIAGNOSIS — I10 ESSENTIAL HYPERTENSION, BENIGN: ICD-10-CM

## 2019-03-22 LAB
ALBUMIN SERPL-MCNC: 4 G/DL (ref 3.4–5)
ALP SERPL-CCNC: 122 U/L (ref 40–150)
ALT SERPL W P-5'-P-CCNC: 22 U/L (ref 0–50)
AMYLASE SERPL-CCNC: 51 U/L (ref 30–110)
ANION GAP SERPL CALCULATED.3IONS-SCNC: 13 MMOL/L (ref 3–14)
AST SERPL W P-5'-P-CCNC: 19 U/L (ref 0–45)
BASOPHILS # BLD AUTO: 0 10E9/L (ref 0–0.2)
BASOPHILS NFR BLD AUTO: 0.2 %
BILIRUB SERPL-MCNC: 0.5 MG/DL (ref 0.2–1.3)
BUN SERPL-MCNC: 14 MG/DL (ref 7–30)
CALCIUM SERPL-MCNC: 9.1 MG/DL (ref 8.5–10.1)
CHLORIDE SERPL-SCNC: 103 MMOL/L (ref 94–109)
CO2 SERPL-SCNC: 21 MMOL/L (ref 20–32)
CREAT SERPL-MCNC: 0.64 MG/DL (ref 0.52–1.04)
DIFFERENTIAL METHOD BLD: NORMAL
EOSINOPHIL # BLD AUTO: 0.1 10E9/L (ref 0–0.7)
EOSINOPHIL NFR BLD AUTO: 0.6 %
ERYTHROCYTE [DISTWIDTH] IN BLOOD BY AUTOMATED COUNT: 12.2 % (ref 10–15)
ERYTHROCYTE [SEDIMENTATION RATE] IN BLOOD BY WESTERGREN METHOD: 27 MM/H (ref 0–30)
GFR SERPL CREATININE-BSD FRML MDRD: >90 ML/MIN/{1.73_M2}
GLUCOSE SERPL-MCNC: 126 MG/DL (ref 70–99)
HCT VFR BLD AUTO: 39.5 % (ref 35–47)
HGB BLD-MCNC: 13.2 G/DL (ref 11.7–15.7)
LIPASE SERPL-CCNC: 104 U/L (ref 73–393)
LYMPHOCYTES # BLD AUTO: 3.6 10E9/L (ref 0.8–5.3)
LYMPHOCYTES NFR BLD AUTO: 35.2 %
MCH RBC QN AUTO: 30.9 PG (ref 26.5–33)
MCHC RBC AUTO-ENTMCNC: 33.4 G/DL (ref 31.5–36.5)
MCV RBC AUTO: 93 FL (ref 78–100)
MONOCYTES # BLD AUTO: 0.9 10E9/L (ref 0–1.3)
MONOCYTES NFR BLD AUTO: 8.9 %
NEUTROPHILS # BLD AUTO: 5.7 10E9/L (ref 1.6–8.3)
NEUTROPHILS NFR BLD AUTO: 55.1 %
PLATELET # BLD AUTO: 308 10E9/L (ref 150–450)
POTASSIUM SERPL-SCNC: 3.8 MMOL/L (ref 3.4–5.3)
PROT SERPL-MCNC: 8.1 G/DL (ref 6.8–8.8)
RBC # BLD AUTO: 4.27 10E12/L (ref 3.8–5.2)
SODIUM SERPL-SCNC: 137 MMOL/L (ref 133–144)
WBC # BLD AUTO: 10.3 10E9/L (ref 4–11)

## 2019-03-22 PROCEDURE — 80053 COMPREHEN METABOLIC PANEL: CPT | Performed by: NURSE PRACTITIONER

## 2019-03-22 PROCEDURE — 36415 COLL VENOUS BLD VENIPUNCTURE: CPT | Performed by: NURSE PRACTITIONER

## 2019-03-22 PROCEDURE — 83690 ASSAY OF LIPASE: CPT | Performed by: NURSE PRACTITIONER

## 2019-03-22 PROCEDURE — 85025 COMPLETE CBC W/AUTO DIFF WBC: CPT | Performed by: NURSE PRACTITIONER

## 2019-03-22 PROCEDURE — 85652 RBC SED RATE AUTOMATED: CPT | Performed by: NURSE PRACTITIONER

## 2019-03-22 PROCEDURE — 99214 OFFICE O/P EST MOD 30 MIN: CPT | Performed by: NURSE PRACTITIONER

## 2019-03-22 PROCEDURE — 82150 ASSAY OF AMYLASE: CPT | Performed by: NURSE PRACTITIONER

## 2019-03-22 RX ORDER — METOPROLOL SUCCINATE 100 MG/1
100 TABLET, EXTENDED RELEASE ORAL DAILY
Qty: 90 TABLET | Refills: 3 | Status: SHIPPED | OUTPATIENT
Start: 2019-03-22 | End: 2020-03-31

## 2019-03-22 RX ORDER — DILTIAZEM HYDROCHLORIDE 240 MG/1
240 CAPSULE, EXTENDED RELEASE ORAL DAILY
Qty: 90 CAPSULE | Refills: 3 | Status: SHIPPED | OUTPATIENT
Start: 2019-03-22 | End: 2020-03-31

## 2019-03-22 RX ORDER — LISINOPRIL/HYDROCHLOROTHIAZIDE 10-12.5 MG
1 TABLET ORAL DAILY
Qty: 90 TABLET | Refills: 3 | Status: SHIPPED | OUTPATIENT
Start: 2019-03-22 | End: 2020-03-31

## 2019-03-22 RX ORDER — OMEPRAZOLE 40 MG/1
40 CAPSULE, DELAYED RELEASE ORAL DAILY
Qty: 60 CAPSULE | Refills: 0 | Status: SHIPPED | OUTPATIENT
Start: 2019-03-22 | End: 2023-11-13

## 2019-03-22 NOTE — PATIENT INSTRUCTIONS
Prilosec as discussed.   Follow up with ultra sound.   I will call you with lab results.   Follow up in clinic in 4 weeks.     Thank you  Staci Bustos CNP

## 2019-03-24 NOTE — RESULT ENCOUNTER NOTE
Please advise Lisa Urbina,  1970, that her lab results were normal pancrease enzymes, metabolic panel normal electrolytes, kidney and liver function. Glucose is mildly elevated may be due to recent carb meal or sugary beverage. Complete blood count normal no indication of infection or anemia. Inflammatory marker negative. Will call with results of ultra sound when available. Please continue treatment plan discussed in clinic   [phone] (home) [phone] (work)  Thank you  Staci Bustos CNP

## 2019-03-25 ENCOUNTER — TELEPHONE (OUTPATIENT)
Dept: FAMILY MEDICINE | Facility: OTHER | Age: 49
End: 2019-03-25

## 2019-03-25 ENCOUNTER — ANCILLARY PROCEDURE (OUTPATIENT)
Dept: ULTRASOUND IMAGING | Facility: OTHER | Age: 49
End: 2019-03-25
Attending: NURSE PRACTITIONER
Payer: COMMERCIAL

## 2019-03-25 DIAGNOSIS — R10.13 EPIGASTRIC PAIN: ICD-10-CM

## 2019-03-25 DIAGNOSIS — R10.11 RIGHT UPPER QUADRANT PAIN: ICD-10-CM

## 2019-03-25 PROCEDURE — 76705 ECHO EXAM OF ABDOMEN: CPT

## 2019-03-25 NOTE — TELEPHONE ENCOUNTER
Left message for patient to return call for test results.    ----- Message from ALFRED Tenorio CNP sent at 3/24/2019  5:39 PM CDT -----  Please advise Lisa Urbina,  1970, that her lab results were normal pancrease enzymes, metabolic panel normal electrolytes, kidney and liver function. Glucose is mildly elevated may be due to recent carb meal or sugary beverage. Complete blood count normal no indication of infection or anemia. Inflammatory marker negative. Will call with results of ultra sound when available. Please continue treatment plan discussed in clinic   [phone] (home) [phone] (work)  Thank you  Staci Bustos CNP

## 2019-03-25 NOTE — TELEPHONE ENCOUNTER
Date of colonoscopy/EGD: 4/25  Surgeon: Dr. Humphrey  Prep:Miralax  Packet:Colonoscopy/EGD instructions mailed to patient's home address.   Date: 3/25/2019      Surgery Scheduler

## 2019-03-25 NOTE — TELEPHONE ENCOUNTER
Left message for patient to return call to schedule colonoscopy or EGD. If Kristie or Adilene are unavailable, please transfer to the surgery center.

## 2019-03-25 NOTE — TELEPHONE ENCOUNTER
Pt had called back and was given the message below.Please call pt's cell phone when results come in from the ultrasound.

## 2019-03-26 NOTE — RESULT ENCOUNTER NOTE
Please advise Lisa Urbina,  1970, that her ultra sound results indicate no gallbladder dysfunction, atherosclerotic plaque aortic wall nothing that would case pain symptoms. Recommend continuing treatment plan as discussed in clinic.    [phone] (home) [phone] (work)  Thank you  Staci Bustos CNP

## 2019-04-25 ENCOUNTER — ANESTHESIA EVENT (OUTPATIENT)
Dept: GASTROENTEROLOGY | Facility: CLINIC | Age: 49
End: 2019-04-25
Payer: COMMERCIAL

## 2019-04-25 ENCOUNTER — ANESTHESIA (OUTPATIENT)
Dept: GASTROENTEROLOGY | Facility: CLINIC | Age: 49
End: 2019-04-25
Payer: COMMERCIAL

## 2019-04-25 ENCOUNTER — HOSPITAL ENCOUNTER (OUTPATIENT)
Facility: CLINIC | Age: 49
Discharge: HOME | End: 2019-04-25
Attending: SURGERY | Admitting: SURGERY
Payer: COMMERCIAL

## 2019-04-25 VITALS
DIASTOLIC BLOOD PRESSURE: 97 MMHG | RESPIRATION RATE: 16 BRPM | HEART RATE: 91 BPM | SYSTOLIC BLOOD PRESSURE: 136 MMHG | TEMPERATURE: 98.3 F | OXYGEN SATURATION: 98 %

## 2019-04-25 LAB — COLONOSCOPY: NORMAL

## 2019-04-25 PROCEDURE — G0121 COLON CA SCRN NOT HI RSK IND: HCPCS | Performed by: SURGERY

## 2019-04-25 PROCEDURE — 25800030 ZZH RX IP 258 OP 636: Performed by: NURSE ANESTHETIST, CERTIFIED REGISTERED

## 2019-04-25 PROCEDURE — 45378 DIAGNOSTIC COLONOSCOPY: CPT | Performed by: SURGERY

## 2019-04-25 PROCEDURE — 40000296 ZZH STATISTIC ENDO RECOVERY CLASS 1:2 FIRST HOUR: Performed by: SURGERY

## 2019-04-25 PROCEDURE — 37000009 ZZH ANESTHESIA TECHNICAL FEE, EACH ADDTL 15 MIN: Performed by: SURGERY

## 2019-04-25 PROCEDURE — 25000128 H RX IP 250 OP 636: Performed by: NURSE ANESTHETIST, CERTIFIED REGISTERED

## 2019-04-25 PROCEDURE — 37000008 ZZH ANESTHESIA TECHNICAL FEE, 1ST 30 MIN: Performed by: SURGERY

## 2019-04-25 PROCEDURE — 25000125 ZZHC RX 250: Performed by: NURSE ANESTHETIST, CERTIFIED REGISTERED

## 2019-04-25 RX ORDER — LIDOCAINE HYDROCHLORIDE 20 MG/ML
INJECTION, SOLUTION INFILTRATION; PERINEURAL PRN
Status: DISCONTINUED | OUTPATIENT
Start: 2019-04-25 | End: 2019-04-25

## 2019-04-25 RX ORDER — LIDOCAINE 40 MG/G
CREAM TOPICAL
Status: DISCONTINUED | OUTPATIENT
Start: 2019-04-25 | End: 2019-04-25 | Stop reason: HOSPADM

## 2019-04-25 RX ORDER — PROPOFOL 10 MG/ML
INJECTION, EMULSION INTRAVENOUS CONTINUOUS PRN
Status: DISCONTINUED | OUTPATIENT
Start: 2019-04-25 | End: 2019-04-25

## 2019-04-25 RX ORDER — SODIUM CHLORIDE, SODIUM LACTATE, POTASSIUM CHLORIDE, CALCIUM CHLORIDE 600; 310; 30; 20 MG/100ML; MG/100ML; MG/100ML; MG/100ML
INJECTION, SOLUTION INTRAVENOUS CONTINUOUS
Status: DISCONTINUED | OUTPATIENT
Start: 2019-04-25 | End: 2019-04-25 | Stop reason: HOSPADM

## 2019-04-25 RX ORDER — PROPOFOL 10 MG/ML
INJECTION, EMULSION INTRAVENOUS PRN
Status: DISCONTINUED | OUTPATIENT
Start: 2019-04-25 | End: 2019-04-25

## 2019-04-25 RX ADMIN — PROPOFOL 30 MG: 10 INJECTION, EMULSION INTRAVENOUS at 11:10

## 2019-04-25 RX ADMIN — LIDOCAINE HYDROCHLORIDE 60 MG: 20 INJECTION, SOLUTION INFILTRATION; PERINEURAL at 11:02

## 2019-04-25 RX ADMIN — SODIUM CHLORIDE, POTASSIUM CHLORIDE, SODIUM LACTATE AND CALCIUM CHLORIDE: 600; 310; 30; 20 INJECTION, SOLUTION INTRAVENOUS at 10:56

## 2019-04-25 RX ADMIN — LIDOCAINE HYDROCHLORIDE 1 ML: 10 INJECTION, SOLUTION EPIDURAL; INFILTRATION; INTRACAUDAL; PERINEURAL at 10:56

## 2019-04-25 RX ADMIN — PROPOFOL 50 MG: 10 INJECTION, EMULSION INTRAVENOUS at 11:04

## 2019-04-25 RX ADMIN — PROPOFOL 50 MG: 10 INJECTION, EMULSION INTRAVENOUS at 11:06

## 2019-04-25 RX ADMIN — PROPOFOL 150 MCG/KG/MIN: 10 INJECTION, EMULSION INTRAVENOUS at 11:03

## 2019-04-25 ASSESSMENT — LIFESTYLE VARIABLES: TOBACCO_USE: 1

## 2019-04-25 NOTE — ANESTHESIA CARE TRANSFER NOTE
Patient: Lisa Urbina    Procedure(s):  Colonoscopy    Diagnosis: Special screening for malignant neoplasms, colon  Diagnosis Additional Information: No value filed.    Anesthesia Type:   MAC     Note:  Airway :Room Air  Patient transferred to:Phase II  Handoff Report: Identifed the Patient, Identified the Reponsible Provider, Reviewed the pertinent medical history, Discussed the surgical course, Reviewed Intra-OP anesthesia mangement and issues during anesthesia, Set expectations for post-procedure period and Allowed opportunity for questions and acknowledgement of understanding      Vitals: (Last set prior to Anesthesia Care Transfer)    CRNA VITALS  4/25/2019 1059 - 4/25/2019 1159      4/25/2019             SpO2:  100 %    Resp Rate (observed):  19                Electronically Signed By: ALFRED Burleson CRNA  April 25, 2019  1:56 PM

## 2019-04-25 NOTE — ANESTHESIA POSTPROCEDURE EVALUATION
Patient: Lisa Urbina    Procedure(s):  Colonoscopy    Diagnosis:Special screening for malignant neoplasms, colon  Diagnosis Additional Information: No value filed.    Anesthesia Type:  MAC    Note:  Anesthesia Post Evaluation    Patient location during evaluation: Phase 2  Patient participation: Able to fully participate in evaluation  Level of consciousness: awake and alert  Pain management: adequate  Airway patency: patent  Cardiovascular status: acceptable  Respiratory status: acceptable  Hydration status: acceptable  PONV: none             Last vitals:  Vitals:    04/25/19 1131 04/25/19 1132 04/25/19 1145   BP: 135/90 125/85 (!) 136/97   Pulse: 94 94 91   Resp:  16 16   Temp:      SpO2:  98% 98%         Electronically Signed By: ALFRED Burleson CRNA  April 25, 2019  1:58 PM
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
## 2019-04-25 NOTE — INTERVAL H&P NOTE
The History and Physical has been reviewed, the patient has been examined and no changes have occurred in the patient's condition since the H & P was completed.       [facility], DO

## 2019-04-25 NOTE — DISCHARGE INSTRUCTIONS
[facility]    Home Care Following Endoscopy          Activity:    You have just undergone an endoscopic procedure usually performed with conscious sedation.  Do not work or operate machinery (including a car) for at least 12 hours.      I encourage you to walk and attempt to pass this air as soon as possible.    Diet:    Return to the diet you were on before your procedure but eat lightly for the first 12-24 hours.    Drink plenty of water.    Resume any regular medications unless otherwise advised by your physician.  Please begin any new medication prescribed as a result of your procedure as directed by your physician.     If you had any biopsy or polyp removed please refrain from aspirin or aspirin products for 2 days.  If on Coumadin please restart as instructed by your physician.   Pain:    You may take Tylenol as needed for pain.  Expected Recovery:    You can expect some mild abdominal fullness and/or discomfort due to the air used to inflate your intestinal tract.     Call Your Physician if You Have:      After Colonoscopy:  o Worsening persisting abdominal pain which is worse with activity.  o Fevers (>101 degrees F), chills or shakes.  o Passage of continued blood with bowel movements.   Any questions or concerns about your recovery, please call [phone] or after hours [phone] Nurse Advice Line.    Follow-up Care:    You should receive a call or letter with your results within 1 week. Please call if you have not received a notification of your results.  If asked to return to clinic please make an appointment 1 week after your procedure.  Call [phone].

## 2019-07-16 ENCOUNTER — ANCILLARY PROCEDURE (OUTPATIENT)
Dept: MAMMOGRAPHY | Facility: OTHER | Age: 49
End: 2019-07-16
Payer: COMMERCIAL

## 2019-07-16 DIAGNOSIS — Z12.31 VISIT FOR SCREENING MAMMOGRAM: ICD-10-CM

## 2019-07-16 PROCEDURE — 77063 BREAST TOMOSYNTHESIS BI: CPT | Mod: TC

## 2019-07-16 PROCEDURE — 77067 SCR MAMMO BI INCL CAD: CPT | Mod: TC

## 2019-11-05 ENCOUNTER — HEALTH MAINTENANCE LETTER (OUTPATIENT)
Age: 49
End: 2019-11-05

## 2020-03-19 ENCOUNTER — VIRTUAL VISIT (OUTPATIENT)
Dept: FAMILY MEDICINE | Facility: OTHER | Age: 50
End: 2020-03-19
Payer: COMMERCIAL

## 2020-03-19 DIAGNOSIS — R09.89 CHEST CONGESTION: ICD-10-CM

## 2020-03-19 DIAGNOSIS — R05.9 COUGH: Primary | ICD-10-CM

## 2020-03-19 PROCEDURE — 99441 ZZC PHYSICIAN TELEPHONE EVALUATION 5-10 MIN: CPT | Performed by: PHYSICIAN ASSISTANT

## 2020-03-19 NOTE — PROGRESS NOTES
"Lisa Urbina is a 49 year old female who is being evaluated via a billable telephone visit.      The patient has been notified of following:     \"This telephone visit will be conducted via a call between you and your physician/provider. We have found that certain health care needs can be provided without the need for a physical exam.  This service lets us provide the care you need with a short phone conversation.  If a prescription is necessary we can send it directly to your pharmacy.  If lab work is needed we can place an order for that and you can then stop by our lab to have the test done at a later time.    If during the course of the call the physician/provider feels a telephone visit is not appropriate, you will not be charged for this service.\"     Lisa Urbina complains of    Chief Complaint   Patient presents with     Cough       I have reviewed and updated the patient's Past Medical History, Social History, Family History and Medication List.    ALLERGIES  Nkda [no known drug allergies]    Patient has a dry hacking nonproductive cough at this point in time states that it started just overnight.  She does not have any significant shortness of breath at this point time but does have some burning chest pressure to the upper anterior chest at this point.  She states that she feels pretty fine but knows she is has this significant cough.  She does not have a fever currently.  She states that her pulse and respiration rate have been within normal limits.  She has not smoked in almost a year.    Advise based on current guidelines that she should be off work for 2 weeks and place yourself under isolation quarantine protocol for the coronavirus.  Her risk factors though truly unknown are not negative as she works in a medical clinic and checks people in, she has been out for groceries and has had other close contact with unknown persons.    Assessment/Plan:  1. Cough  2. Chest congestion  Possible " coronavirus at this point time she is to isolate and quarantine herself for 2 weeks from today.  She declines any further medication for this at this point in time.    Phone call duration:  5 minutes    Electronically signed:    iMo Jara PA-C

## 2020-03-30 DIAGNOSIS — I10 ESSENTIAL HYPERTENSION, BENIGN: ICD-10-CM

## 2020-03-31 RX ORDER — DILTIAZEM HYDROCHLORIDE 240 MG/1
CAPSULE, EXTENDED RELEASE ORAL
Qty: 90 CAPSULE | Refills: 0 | Status: SHIPPED | OUTPATIENT
Start: 2020-03-31 | End: 2020-07-13

## 2020-03-31 RX ORDER — LISINOPRIL/HYDROCHLOROTHIAZIDE 10-12.5 MG
TABLET ORAL
Qty: 90 TABLET | Refills: 0 | Status: SHIPPED | OUTPATIENT
Start: 2020-03-31 | End: 2020-07-13

## 2020-03-31 RX ORDER — METOPROLOL SUCCINATE 100 MG/1
TABLET, EXTENDED RELEASE ORAL
Qty: 90 TABLET | Refills: 0 | Status: SHIPPED | OUTPATIENT
Start: 2020-03-31 | End: 2020-07-13

## 2020-03-31 NOTE — TELEPHONE ENCOUNTER
Pending Prescriptions:                       Disp   Refills    CARTIA  MG 24 hr capsule [Pharmacy M*90 cap*3        Sig: TAKE ONE CAPSULE BY MOUTH ONCE DAILY    lisinopril-hydrochlorothiazide (ZESTORETIC*90 tab*3        Sig: TAKE ONE TABLET BY MOUTH ONCE DAILY    metoprolol succinate ER (TOPROL-XL) 100 MG*90 tab*3        Sig: TAKE ONE TABLET BY MOUTH ONCE DAILY IN THE EVENING      Routing refill request to provider for review/approval because:  Drug not active on patient's medication list - Cartia  BP/labs out of range or not current.     Lisinopril & Metoprolol  Last Written Prescription Date:  3/22/2019  Last Fill Quantity: 90,  # refills: 3   Last office visit: 3/19/2020 with prescribing provider:  0   Future Office Visit:

## 2020-03-31 NOTE — TELEPHONE ENCOUNTER
Due to Covid 19 refills given for 3 mos will need ov in July for further refills as lab and physical due.     Thank you  Staci Bustos CNP

## 2020-07-08 NOTE — PROGRESS NOTES
SUBJECTIVE:   CC: Lisa Urbina is an 49 year old woman who presents for preventive health visit.     Healthy Habits:    Getting at least 3 servings of Calcium per day:  Yes    Bi-annual eye exam:  Yes    Dental care twice a year:  Yes    Sleep apnea or symptoms of sleep apnea:  None    Diet:  Regular (no restrictions)    Frequency of exercise:  None    Duration of exercise:  N/A    Barriers to taking medications:  None    Medication side effects:  None    PHQ-2 Total Score:    Additional concerns today:  No      Today's PHQ-2 Score:   PHQ-2 (  [employer]) 2018   Q1: Little interest or pleasure in doing things 0   Q2: Feeling down, depressed or hopeless 0   PHQ-2 Score 0   Q1: Little interest or pleasure in doing things Not at all   Q2: Feeling down, depressed or hopeless Not at all   PHQ-2 Score 0       Abuse: Current or Past(Physical, Sexual or Emotional)- No  Do you feel safe in your environment? Yes        Social History     Tobacco Use     Smoking status: Former Smoker     Types: Cigarettes     Last attempt to quit: 2012     Years since quittin.3     Smokeless tobacco: Never Used   Substance Use Topics     Alcohol use: Yes     Comment: weekends     If you drink alcohol do you typically have >3 drinks per day or >7 drinks per week? No    Alcohol Use 2018   Prescreen: >3 drinks/day or >7 drinks/week? No       Reviewed orders with patient.  Reviewed health maintenance and updated orders accordingly - Yes  Labs reviewed in EPIC  BP Readings from Last 3 Encounters:   20 126/74   19 (!) 136/97   19 118/70    Wt Readings from Last 3 Encounters:   20 65.2 kg (143 lb 12 oz)   19 63 kg (139 lb)   18 61.7 kg (136 lb)                  Patient Active Problem List   Diagnosis     Essential hypertension     S/P laparoscopic assisted vaginal hysterectomy (LAVH)     Astigmatism     Past Surgical History:   Procedure Laterality Date     C ECHO HEART XTHORACIC,COMPLETE,  W/O DOPPLER  2004    stress echo, normal     C THORACENTESIS,INSRT CHEST TUBE,PTX       COLONOSCOPY N/A 2019    Procedure: Colonoscopy;  Surgeon: Wellington Humphrey MD;  Location: [facility]     CYSTOSCOPY  10/14/2013    Procedure: CYSTOSCOPY;;  Surgeon: Elizabeth Arnold MD;  Location: [facility]     HYSTERECTOMY, PAP NO LONGER INDICATED       LAPAROSCOPIC ASSISTED HYSTERECTOMY VAGINAL  10/14/2013    Procedure: LAPAROSCOPIC ASSISTED HYSTERECTOMY VAGINAL;  Laparoscopic Assisted VAginal Hysterectomy, cystoscopy.;  Surgeon: Elizabeth Arnold MD;  Location: [facility]     TONSILLECTOMY  2007       Social History     Tobacco Use     Smoking status: Former Smoker     Types: Cigarettes     Last attempt to quit: 2012     Years since quittin.3     Smokeless tobacco: Never Used   Substance Use Topics     Alcohol use: Yes     Comment: weekends     Family History   Problem Relation Age of Onset     Hypertension Father      Hypertension Mother      Heart Disease Mother      Diabetes Maternal Grandfather      Cerebrovascular Disease Maternal Aunt         4 strokes, 1st at age 36     Anesthesia Reaction No family hx of          Current Outpatient Medications   Medication Sig Dispense Refill     diltiazem ER COATED BEADS (CARTIA XT) 240 MG 24 hr capsule TAKE ONE CAPSULE BY MOUTH ONCE DAILY 90 capsule 3     lisinopril-hydrochlorothiazide (ZESTORETIC) 10-12.5 MG tablet Take 1 tablet by mouth daily 90 tablet 3     metoprolol succinate ER (TOPROL-XL) 100 MG 24 hr tablet Take 1 tablet (100 mg) by mouth daily 90 tablet 3     omeprazole (PRILOSEC) 40 MG DR capsule Take 1 capsule (40 mg) by mouth daily 60 capsule 0     Allergies   Allergen Reactions     Nkda [No Known Drug Allergies]      Recent Labs   Lab Test 19  1336 18  1448 18  1030 17  0833 12/01/15  0913 11/25/15  0802  10/10/13  1234  13  1543   A1C  --   --   --   --  5.8  --   --  5.7  --   --    LDL  --   --  99 88  --  105*   < >  --   --    --    HDL  --   --  55 64  --  55   < >  --   --   --    TRIG  --   --  160* 112  --  191*   < >  --   --   --    ALT 22  --   --  20 23  --    < >  --    < >  --    CR 0.64 0.76  --  0.66  --  0.68   < >  --    < >  --    GFRESTIMATED >90 81  --  >90  Non  GFR Calc    --  >90  Non  GFR Calc     < >  --    < >  --    GFRESTBLACK >90 >90  --  >90  African American GFR Calc    --  >90   GFR Calc     < >  --    < >  --    POTASSIUM 3.8 4.0  --  3.7  --  4.1   < >  --    < >  --    TSH  --   --   --   --   --   --   --   --   --  1.80    < > = values in this interval not displayed.        Mammogram Screening: Patient over age 50, mutual decision to screen reflected in health maintenance.    Pertinent mammograms are reviewed under the imaging tab.  History of abnormal Pap smear: Status post benign hysterectomy. Health Maintenance and Surgical History updated.  PAP / HPV 4/8/2011 2/26/2010 2/13/2009   PAP NIL NIL NIL     Reviewed and updated as needed this visit by clinical staff         Reviewed and updated as needed this visit by Provider            Review of Systems  CONSTITUTIONAL: NEGATIVE for fever, chills, change in weight  INTEGUMENTARY/SKIN: NEGATIVE for worrisome rashes, moles or lesions  EYES: NEGATIVE for vision changes or irritation  ENT: NEGATIVE for ear, mouth and throat problems  RESP: NEGATIVE for significant cough or SOB  BREAST: NEGATIVE for masses, tenderness or discharge  CV: NEGATIVE for chest pain, palpitations or peripheral edema  GI: NEGATIVE for nausea, abdominal pain, heartburn, or change in bowel habits  : NEGATIVE for unusual urinary or vaginal symptoms. No vaginal bleeding.  MUSCULOSKELETAL: NEGATIVE for significant arthralgias or myalgia  NEURO: NEGATIVE for weakness, dizziness or paresthesias  PSYCHIATRIC: NEGATIVE for changes in mood or affect      OBJECTIVE:   LMP 09/11/2013   Physical Exam  GENERAL APPEARANCE: healthy, alert and no  distress  EYES: Eyes grossly normal to inspection, PERRL and conjunctivae and sclerae normal  HENT: ear canals and TM's normal, nose and mouth without ulcers or lesions, oropharynx clear and oral mucous membranes moist  NECK: no adenopathy, no asymmetry, masses, or scars and thyroid normal to palpation  RESP: lungs clear to auscultation - no rales, rhonchi or wheezes  BREAST: normal without masses, tenderness or nipple discharge and no palpable axillary masses or adenopathy  CV: regular rate and rhythm, normal S1 S2, no S3 or S4, no murmur, click or rub, no peripheral edema and peripheral pulses strong  ABDOMEN: soft, nontender, no hepatosplenomegaly, no masses and bowel sounds normal  MS: no musculoskeletal defects are noted and gait is age appropriate without ataxia  SKIN: no suspicious lesions or rashes  NEURO: Normal strength and tone, sensory exam grossly normal, mentation intact and speech normal  PSYCH: mentation appears normal and affect normal/bright      ASSESSMENT/PLAN:       ICD-10-CM    1. Encounter for screening mammogram for breast cancer  Z12.31    2. Routine general medical examination at a health care facility  Z00.00 BASIC METABOLIC PANEL     MA SCREENING DIGITAL BILAT - Future  (s+30)     Lipid panel reflex to direct LDL Fasting     TSH with free T4 reflex   3. Screening for HIV (human immunodeficiency virus)  Z11.4    4. Essential hypertension  I10    5. Visit for screening mammogram  Z12.31    6. Essential hypertension, benign  I10 BASIC METABOLIC PANEL     diltiazem ER COATED BEADS (CARTIA XT) 240 MG 24 hr capsule     lisinopril-hydrochlorothiazide (ZESTORETIC) 10-12.5 MG tablet     metoprolol succinate ER (TOPROL-XL) 100 MG 24 hr tablet     Lipid panel reflex to direct LDL Fasting   7. Lipid screening  Z13.220 Lipid panel reflex to direct LDL Fasting   8. Family history of diabetes mellitus  Z83.3 TSH with free T4 reflex       COUNSELING:  Reviewed preventive health counseling, as reflected  "in patient instructions       Regular exercise       Healthy diet/nutrition       Vision screening       Immunizations    Declined: Zoster due to Cost               Osteoporosis Prevention/Bone Health       HIV screeninx in teen years, 1x in adult years, and at intervals if high risk    Estimated body mass index is 23.73 kg/m  as calculated from the following:    Height as of 18: 1.63 m (5' 4.17\").    Weight as of 3/22/19: 63 kg (139 lb).         reports that she quit smoking about 8 years ago. Her smoking use included cigarettes. She has never used smokeless tobacco.      Counseling Resources:  ATP IV Guidelines  Pooled Cohorts Equation Calculator  Breast Cancer Risk Calculator  FRAX Risk Assessment  ICSI Preventive Guidelines  Dietary Guidelines for Americans, 2010  USDA's MyPlate  ASA Prophylaxis  Lung CA Screening    [facility]  "

## 2020-07-13 ENCOUNTER — OFFICE VISIT (OUTPATIENT)
Dept: FAMILY MEDICINE | Facility: OTHER | Age: 50
End: 2020-07-13
Payer: COMMERCIAL

## 2020-07-13 VITALS
HEIGHT: 64 IN | OXYGEN SATURATION: 100 % | BODY MASS INDEX: 24.54 KG/M2 | TEMPERATURE: 97.1 F | HEART RATE: 69 BPM | WEIGHT: 143.75 LBS | DIASTOLIC BLOOD PRESSURE: 74 MMHG | SYSTOLIC BLOOD PRESSURE: 126 MMHG | RESPIRATION RATE: 16 BRPM

## 2020-07-13 DIAGNOSIS — Z13.220 LIPID SCREENING: ICD-10-CM

## 2020-07-13 DIAGNOSIS — I10 ESSENTIAL HYPERTENSION, BENIGN: ICD-10-CM

## 2020-07-13 DIAGNOSIS — I10 ESSENTIAL HYPERTENSION: ICD-10-CM

## 2020-07-13 DIAGNOSIS — Z83.3 FAMILY HISTORY OF DIABETES MELLITUS: ICD-10-CM

## 2020-07-13 DIAGNOSIS — Z11.4 SCREENING FOR HIV (HUMAN IMMUNODEFICIENCY VIRUS): ICD-10-CM

## 2020-07-13 DIAGNOSIS — Z00.00 ROUTINE GENERAL MEDICAL EXAMINATION AT A HEALTH CARE FACILITY: ICD-10-CM

## 2020-07-13 DIAGNOSIS — Z12.31 VISIT FOR SCREENING MAMMOGRAM: ICD-10-CM

## 2020-07-13 DIAGNOSIS — Z12.31 ENCOUNTER FOR SCREENING MAMMOGRAM FOR BREAST CANCER: Primary | ICD-10-CM

## 2020-07-13 LAB
ANION GAP SERPL CALCULATED.3IONS-SCNC: 4 MMOL/L (ref 3–14)
BUN SERPL-MCNC: 14 MG/DL (ref 7–30)
CALCIUM SERPL-MCNC: 9.1 MG/DL (ref 8.5–10.1)
CHLORIDE SERPL-SCNC: 103 MMOL/L (ref 94–109)
CHOLEST SERPL-MCNC: 232 MG/DL
CO2 SERPL-SCNC: 31 MMOL/L (ref 20–32)
CREAT SERPL-MCNC: 0.77 MG/DL (ref 0.52–1.04)
GFR SERPL CREATININE-BSD FRML MDRD: 89 ML/MIN/{1.73_M2}
GLUCOSE SERPL-MCNC: 105 MG/DL (ref 70–99)
HDLC SERPL-MCNC: 61 MG/DL
LDLC SERPL CALC-MCNC: 124 MG/DL
NONHDLC SERPL-MCNC: 171 MG/DL
POTASSIUM SERPL-SCNC: 4.1 MMOL/L (ref 3.4–5.3)
SODIUM SERPL-SCNC: 138 MMOL/L (ref 133–144)
TRIGL SERPL-MCNC: 233 MG/DL
TSH SERPL DL<=0.005 MIU/L-ACNC: 1.94 MU/L (ref 0.4–4)

## 2020-07-13 PROCEDURE — 84443 ASSAY THYROID STIM HORMONE: CPT | Performed by: NURSE PRACTITIONER

## 2020-07-13 PROCEDURE — 99396 PREV VISIT EST AGE 40-64: CPT | Performed by: NURSE PRACTITIONER

## 2020-07-13 PROCEDURE — 36415 COLL VENOUS BLD VENIPUNCTURE: CPT | Performed by: NURSE PRACTITIONER

## 2020-07-13 PROCEDURE — 80048 BASIC METABOLIC PNL TOTAL CA: CPT | Performed by: NURSE PRACTITIONER

## 2020-07-13 PROCEDURE — 80061 LIPID PANEL: CPT | Performed by: NURSE PRACTITIONER

## 2020-07-13 RX ORDER — LISINOPRIL/HYDROCHLOROTHIAZIDE 10-12.5 MG
1 TABLET ORAL DAILY
Qty: 90 TABLET | Refills: 3 | Status: SHIPPED | OUTPATIENT
Start: 2020-07-13 | End: 2021-08-18

## 2020-07-13 RX ORDER — METOPROLOL SUCCINATE 100 MG/1
100 TABLET, EXTENDED RELEASE ORAL DAILY
Qty: 90 TABLET | Refills: 3 | Status: SHIPPED | OUTPATIENT
Start: 2020-07-13 | End: 2021-08-18

## 2020-07-13 RX ORDER — DILTIAZEM HYDROCHLORIDE 240 MG/1
CAPSULE, COATED, EXTENDED RELEASE ORAL
Qty: 90 CAPSULE | Refills: 3 | Status: SHIPPED | OUTPATIENT
Start: 2020-07-13 | End: 2021-08-18

## 2020-07-13 ASSESSMENT — MIFFLIN-ST. JEOR: SCORE: 1257.92

## 2020-07-14 NOTE — RESULT ENCOUNTER NOTE
Please advise Lisa SUDHA Urbina, ANITA 1970,    [phone] (home) [phone] (work)    that her lab results were normal thyroid hormone.     The results of your recent lipid (cholesterol) profile were abnormal.    Here are the results:  Lab Results       Component                Value               Date                       CHOL                     232                 2020            Lab Results       Component                Value               Date                       HDL                      61                  2020            Lab Results       Component                Value               Date                       LDL                      124                 2020            Lab Results       Component                Value               Date                       TRIG                     233                 2020            Lab Results       Component                Value               Date                       CHOLHDLRATIO             4.0                 2014              Desired or goal levels are:  CHOLESTEROL: Desirable is less than 200.   HDL (Good Cholesterol): Desirable is greater than 40 (for men) greater than 50 (for women).  LDL (Bad Cholesterol): Desirable is less than 130 (or less than 100 if you have heart disease or diabetes). Borderline 130-160.  TRIGLYCERIDES: Desirable is less than 150.  Borderline is 150-200.    I recommend that you take Omega-3 fatty acids (available in capsules to improve your triglycerides. Please begin with 1000 mg daily of EPA + DHA..  For high triglycerides, reduce the intake of jam, jelly, honey, alcohol, and/or coffee creamers  Your HDL (the good cholesterol) level is low, no medication is required at this point. Reducing your total fat intake, increasing exercise level, reducing weight, adding olive oil to your diet, etc, may help to increase this level..    As you may know, an elevated cholesterol is one factor that increases your  risk for heart disease and stroke. You can improve your cholesterol by controlling the amount and type of fat you eat and by increasing your daily activity level.    Here are some ways to improve your nutrition:  Eat less fat (especially butter, Crisco and other saturated fats)  Buy lean cuts of meat, reduce your portions of red meat or substitute poultry or fish  Use skim milk and low-fat dairy products  Eat no more than 4 egg yolks per week  Avoid fried or fast foods that are high in fat  Eat more fruits and vegetables      Also consider starting or increasing your aerobic activity. Aerobic activity is the best way to improve HDL (good) cholesterol. If this would be new to you, please talk with me first about what activities are safe for you.      Thank you  Staci Bustos CNP

## 2020-07-30 ENCOUNTER — HOSPITAL ENCOUNTER (OUTPATIENT)
Dept: MAMMOGRAPHY | Facility: CLINIC | Age: 50
Discharge: HOME | End: 2020-07-30
Attending: NURSE PRACTITIONER | Admitting: NURSE PRACTITIONER
Payer: COMMERCIAL

## 2020-07-30 DIAGNOSIS — Z00.00 ROUTINE GENERAL MEDICAL EXAMINATION AT A HEALTH CARE FACILITY: ICD-10-CM

## 2020-07-30 PROCEDURE — 77063 BREAST TOMOSYNTHESIS BI: CPT

## 2020-11-22 ENCOUNTER — HEALTH MAINTENANCE LETTER (OUTPATIENT)
Age: 50
End: 2020-11-22

## 2021-08-03 ENCOUNTER — HOSPITAL ENCOUNTER (OUTPATIENT)
Dept: MAMMOGRAPHY | Facility: CLINIC | Age: 51
Discharge: HOME | End: 2021-08-03
Attending: NURSE PRACTITIONER | Admitting: NURSE PRACTITIONER
Payer: COMMERCIAL

## 2021-08-03 DIAGNOSIS — Z12.31 VISIT FOR SCREENING MAMMOGRAM: ICD-10-CM

## 2021-08-03 PROCEDURE — 77063 BREAST TOMOSYNTHESIS BI: CPT

## 2021-08-16 DIAGNOSIS — I10 ESSENTIAL HYPERTENSION, BENIGN: ICD-10-CM

## 2021-08-18 RX ORDER — LISINOPRIL/HYDROCHLOROTHIAZIDE 10-12.5 MG
TABLET ORAL
Qty: 90 TABLET | Refills: 0 | Status: SHIPPED | OUTPATIENT
Start: 2021-08-18 | End: 2021-09-29

## 2021-08-18 RX ORDER — METOPROLOL SUCCINATE 100 MG/1
TABLET, EXTENDED RELEASE ORAL
Qty: 90 TABLET | Refills: 0 | Status: SHIPPED | OUTPATIENT
Start: 2021-08-18 | End: 2021-09-29

## 2021-08-18 RX ORDER — DILTIAZEM HYDROCHLORIDE 240 MG/1
CAPSULE, COATED, EXTENDED RELEASE ORAL
Qty: 90 CAPSULE | Refills: 0 | Status: SHIPPED | OUTPATIENT
Start: 2021-08-18 | End: 2021-09-29

## 2021-08-18 NOTE — TELEPHONE ENCOUNTER
Pending Prescriptions:                       Disp   Refills    lisinopril-hydrochlorothiazide (ZESTORETI*90 tab*0            Sig: TAKE ONE TABLET BY MOUTH ONCE DAILY    metoprolol succinate ER (TOPROL-XL) 100 M*90 tab*0            Sig: TAKE ONE TABLET BY MOUTH ONCE DAILY    diltiazem ER COATED BEADS (CARDIZEM CD/CA*90 cap*0            Sig: TAKE ONE CAPSULE BY MOUTH ONCE DAILY    Medication is being filled for 1 time mckenna refill only due to:  Patient is due for medication follow up    Please call and help schedule.  Thank you!    Josefina Mix RN on 8/18/2021 at 3:42 PM

## 2021-09-19 ENCOUNTER — HEALTH MAINTENANCE LETTER (OUTPATIENT)
Age: 51
End: 2021-09-19

## 2021-09-29 ENCOUNTER — OFFICE VISIT (OUTPATIENT)
Dept: FAMILY MEDICINE | Facility: CLINIC | Age: 51
End: 2021-09-29
Payer: COMMERCIAL

## 2021-09-29 VITALS
WEIGHT: 140 LBS | HEART RATE: 114 BPM | BODY MASS INDEX: 23.32 KG/M2 | OXYGEN SATURATION: 98 % | HEIGHT: 65 IN | DIASTOLIC BLOOD PRESSURE: 78 MMHG | SYSTOLIC BLOOD PRESSURE: 128 MMHG | TEMPERATURE: 98.3 F | RESPIRATION RATE: 14 BRPM

## 2021-09-29 DIAGNOSIS — Z00.00 ROUTINE GENERAL MEDICAL EXAMINATION AT A HEALTH CARE FACILITY: Primary | ICD-10-CM

## 2021-09-29 DIAGNOSIS — I10 ESSENTIAL HYPERTENSION, BENIGN: ICD-10-CM

## 2021-09-29 PROCEDURE — 99396 PREV VISIT EST AGE 40-64: CPT | Performed by: NURSE PRACTITIONER

## 2021-09-29 RX ORDER — DILTIAZEM HYDROCHLORIDE 240 MG/1
240 CAPSULE, COATED, EXTENDED RELEASE ORAL DAILY
Qty: 90 CAPSULE | Refills: 2 | Status: SHIPPED | OUTPATIENT
Start: 2021-09-29 | End: 2022-10-17

## 2021-09-29 RX ORDER — LISINOPRIL/HYDROCHLOROTHIAZIDE 10-12.5 MG
1 TABLET ORAL DAILY
Qty: 90 TABLET | Refills: 2 | Status: SHIPPED | OUTPATIENT
Start: 2021-09-29 | End: 2022-10-17

## 2021-09-29 RX ORDER — METOPROLOL SUCCINATE 100 MG/1
100 TABLET, EXTENDED RELEASE ORAL DAILY
Qty: 90 TABLET | Refills: 2 | Status: SHIPPED | OUTPATIENT
Start: 2021-09-29 | End: 2022-10-17

## 2021-09-29 ASSESSMENT — MIFFLIN-ST. JEOR: SCORE: 1245.92

## 2021-09-29 ASSESSMENT — ENCOUNTER SYMPTOMS
SORE THROAT: 0
EYE PAIN: 0
NERVOUS/ANXIOUS: 0
HEMATURIA: 0
ARTHRALGIAS: 0
NAUSEA: 0
HEARTBURN: 0
PARESTHESIAS: 0
ABDOMINAL PAIN: 0
DIARRHEA: 0
JOINT SWELLING: 0
FEVER: 0
CHILLS: 0
WEAKNESS: 0
HEMATOCHEZIA: 0
COUGH: 0
BREAST MASS: 0
DYSURIA: 0
SHORTNESS OF BREATH: 0
MYALGIAS: 0
PALPITATIONS: 0
HEADACHES: 0
FREQUENCY: 0
DIZZINESS: 0
CONSTIPATION: 0

## 2021-09-29 ASSESSMENT — PAIN SCALES - GENERAL: PAINLEVEL: NO PAIN (0)
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
## 2021-10-06 ENCOUNTER — LAB (OUTPATIENT)
Dept: LAB | Facility: OTHER | Age: 51
End: 2021-10-06
Payer: COMMERCIAL

## 2021-10-06 DIAGNOSIS — Z00.00 ROUTINE GENERAL MEDICAL EXAMINATION AT A HEALTH CARE FACILITY: ICD-10-CM

## 2021-10-06 LAB
ANION GAP SERPL CALCULATED.3IONS-SCNC: 4 MMOL/L (ref 3–14)
BUN SERPL-MCNC: 13 MG/DL (ref 7–30)
CALCIUM SERPL-MCNC: 8.8 MG/DL (ref 8.5–10.1)
CHLORIDE BLD-SCNC: 110 MMOL/L (ref 94–109)
CHOLEST SERPL-MCNC: 210 MG/DL
CO2 SERPL-SCNC: 28 MMOL/L (ref 20–32)
CREAT SERPL-MCNC: 0.8 MG/DL (ref 0.52–1.04)
FASTING STATUS PATIENT QL REPORTED: YES
GFR SERPL CREATININE-BSD FRML MDRD: 85 ML/MIN/1.73M2
GLUCOSE BLD-MCNC: 117 MG/DL (ref 70–99)
HDLC SERPL-MCNC: 56 MG/DL
LDLC SERPL CALC-MCNC: 109 MG/DL
NONHDLC SERPL-MCNC: 154 MG/DL
POTASSIUM BLD-SCNC: 4.5 MMOL/L (ref 3.4–5.3)
SODIUM SERPL-SCNC: 142 MMOL/L (ref 133–144)
TRIGL SERPL-MCNC: 226 MG/DL
TSH SERPL DL<=0.005 MIU/L-ACNC: 3.84 MU/L (ref 0.4–4)

## 2021-10-06 PROCEDURE — 80048 BASIC METABOLIC PNL TOTAL CA: CPT

## 2021-10-06 PROCEDURE — 84443 ASSAY THYROID STIM HORMONE: CPT

## 2021-10-06 PROCEDURE — 80061 LIPID PANEL: CPT

## 2021-10-06 PROCEDURE — 36415 COLL VENOUS BLD VENIPUNCTURE: CPT

## 2021-10-14 ENCOUNTER — OFFICE VISIT (OUTPATIENT)
Dept: ORTHOPEDICS | Facility: OTHER | Age: 51
End: 2021-10-14
Payer: COMMERCIAL

## 2021-10-14 ENCOUNTER — ANCILLARY PROCEDURE (OUTPATIENT)
Dept: GENERAL RADIOLOGY | Facility: OTHER | Age: 51
End: 2021-10-14
Attending: ORTHOPAEDIC SURGERY
Payer: COMMERCIAL

## 2021-10-14 VITALS — WEIGHT: 140 LBS | BODY MASS INDEX: 23.32 KG/M2 | HEIGHT: 65 IN | RESPIRATION RATE: 16 BRPM

## 2021-10-14 DIAGNOSIS — M18.12 PRIMARY OSTEOARTHRITIS OF FIRST CARPOMETACARPAL JOINT OF LEFT HAND: ICD-10-CM

## 2021-10-14 DIAGNOSIS — M79.645 THUMB PAIN, LEFT: Primary | ICD-10-CM

## 2021-10-14 DIAGNOSIS — M79.645 THUMB PAIN, LEFT: ICD-10-CM

## 2021-10-14 PROCEDURE — 73140 X-RAY EXAM OF FINGER(S): CPT | Mod: LT | Performed by: RADIOLOGY

## 2021-10-14 PROCEDURE — 20604 DRAIN/INJ JOINT/BURSA W/US: CPT | Mod: LT | Performed by: ORTHOPAEDIC SURGERY

## 2021-10-14 RX ORDER — TRIAMCINOLONE ACETONIDE 40 MG/ML
20 INJECTION, SUSPENSION INTRA-ARTICULAR; INTRAMUSCULAR
Status: SHIPPED | OUTPATIENT
Start: 2021-10-14

## 2021-10-14 RX ORDER — LIDOCAINE HYDROCHLORIDE 10 MG/ML
0.5 INJECTION, SOLUTION INFILTRATION; PERINEURAL
Status: SHIPPED | OUTPATIENT
Start: 2021-10-14

## 2021-10-14 RX ADMIN — LIDOCAINE HYDROCHLORIDE 0.5 ML: 10 INJECTION, SOLUTION INFILTRATION; PERINEURAL at 09:00

## 2021-10-14 RX ADMIN — TRIAMCINOLONE ACETONIDE 20 MG: 40 INJECTION, SUSPENSION INTRA-ARTICULAR; INTRAMUSCULAR at 09:00

## 2021-10-14 ASSESSMENT — MIFFLIN-ST. JEOR: SCORE: 1245.92

## 2021-10-14 NOTE — PROGRESS NOTES
Small Joint Injection/Arthrocentesis: L thumb CMC    Date/Time: 10/14/2021 9:00 AM  Performed by: Sg Tai MD  Authorized by: Sg Tai MD   Indications:  Pain  Needle Size:  25 G  Guidance: ultrasound     Approach:  Radial  Location:  Thumb    Site:  L thumb CMC                    Medications:  0.5 mL lidocaine 1 %; 20 mg triamcinolone 40 MG/ML        Outcome:  Tolerated well, no immediate complications  Procedure discussed: discussed risks, benefits, and alternatives    Consent Given by:  Patient
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
## 2021-11-23 ENCOUNTER — E-VISIT (OUTPATIENT)
Dept: URGENT CARE | Facility: URGENT CARE | Age: 51
End: 2021-11-23
Payer: COMMERCIAL

## 2021-11-23 DIAGNOSIS — N39.0 ACUTE UTI (URINARY TRACT INFECTION): Primary | ICD-10-CM

## 2021-11-23 PROCEDURE — 99421 OL DIG E/M SVC 5-10 MIN: CPT | Performed by: NURSE PRACTITIONER

## 2021-11-23 RX ORDER — NITROFURANTOIN 25; 75 MG/1; MG/1
100 CAPSULE ORAL 2 TIMES DAILY
Qty: 10 CAPSULE | Refills: 0 | Status: SHIPPED | OUTPATIENT
Start: 2021-11-23 | End: 2021-11-28

## 2021-11-23 NOTE — PATIENT INSTRUCTIONS
Dear Lisa Urbina    After reviewing your responses, I've been able to diagnose you with a urinary tract infection, which is a common infection of the bladder with bacteria.  This is not a sexually transmitted infection, though urinating immediately after intercourse can help prevent infections.  Drinking lots of fluids is also helpful to clear your current infection and prevent the next one.      I have sent a prescription for antibiotics to your pharmacy to treat this infection.    It is important that you take all of your prescribed medication even if your symptoms are improving after a few doses.  Taking all of your medicine helps prevent the symptoms from returning.     If your symptoms worsen, you develop pain in your back or stomach, develop fevers, or are not improving in 5 days, please contact your primary care provider for an appointment or visit any of our convenient Walk-in or Urgent Care Centers to be seen, which can be found on our website here.    Thanks again for choosing us as your health care partner,    ALFRED Delgado CNP    Urinary Tract Infections in Women  Urinary tract infections (UTIs) are most often caused by bacteria. These bacteria enter the urinary tract. The bacteria may come from inside the body. Or they may travel from the skin outside the rectum or vagina into the urethra. Female anatomy makes it easy for bacteria from the bowel to enter a woman s urinary tract, which is the most common source of UTI. This means women develop UTIs more often than men. Pain in or around the urinary tract is a common UTI symptom. But the only way to know for sure if you have a UTI for the healthcare provider to test your urine. The two tests that may be done are the urinalysis and urine culture.     Types of UTIs    Cystitis. A bladder infection (cystitis) is the most common UTI in women. You may have urgent or frequent need to pee. You may also have pain, burning when you pee, and bloody  urine.    Urethritis. This is an inflamed urethra, which is the tube that carries urine from the bladder to outside the body. You may have lower stomach or back pain. You may also have urgent or frequent need to pee.    Pyelonephritis. This is a kidney infection. If not treated, it can be serious and damage your kidneys. In severe cases, you may need to stay in the hospital. You may have a fever and lower back pain.    Medicines to treat a UTI  Most UTIs are treated with antibiotics. These kill the bacteria. The length of time you need to take them depends on the type of infection. It may be as short as 3 days. If you have repeated UTIs, you may need a low-dose antibiotic for several months. Take antibiotics exactly as directed. Don t stop taking them until all of the medicine is gone. If you stop taking the antibiotic too soon, the infection may not go away. You may also develop a resistance to the antibiotic. This can make it much harder to treat.   Lifestyle changes to treat and prevent UTIs   The lifestyle changes below will help get rid of your UTI. They may also help prevent future UTIs.     Drink plenty of fluids. This includes water, juice, or other caffeine-free drinks. Fluids help flush bacteria out of your body.    Empty your bladder. Always empty your bladder when you feel the urge to pee. And always pee before going to sleep. Urine that stays in your bladder can lead to infection. Try to pee before and after sex as well.    Practice good personal hygiene. Wipe yourself from front to back after using the toilet. This helps keep bacteria from getting into the urethra.    Use condoms during sex. These help prevent UTIs caused by sexually transmitted bacteria. Also don't use spermicides during sex. These can increase the risk for UTIs. Choose other forms of birth control instead. For women who tend to get UTIs after sex, a low-dose of a preventive antibiotic may be used. Be sure to discuss this option with  your healthcare provider.    Follow up with your healthcare provider as directed. He or she may test to make sure the infection has cleared. If needed, more treatment may be started.  Jer last reviewed this educational content on 7/1/2019 2000-2021 [employer]. All rights reserved. This information is not intended as a substitute for professional medical care. Always follow your healthcare professional's instructions.

## 2022-01-28 ENCOUNTER — OFFICE VISIT (OUTPATIENT)
Dept: FAMILY MEDICINE | Facility: OTHER | Age: 52
End: 2022-01-28
Payer: COMMERCIAL

## 2022-01-28 VITALS
HEIGHT: 65 IN | OXYGEN SATURATION: 96 % | RESPIRATION RATE: 20 BRPM | WEIGHT: 143 LBS | BODY MASS INDEX: 23.82 KG/M2 | DIASTOLIC BLOOD PRESSURE: 70 MMHG | TEMPERATURE: 97.3 F | HEART RATE: 98 BPM | SYSTOLIC BLOOD PRESSURE: 138 MMHG

## 2022-01-28 DIAGNOSIS — F41.8 SITUATIONAL ANXIETY: Primary | ICD-10-CM

## 2022-01-28 PROCEDURE — 99214 OFFICE O/P EST MOD 30 MIN: CPT | Performed by: PHYSICIAN ASSISTANT

## 2022-01-28 RX ORDER — LORAZEPAM 0.5 MG/1
.5-1 TABLET ORAL EVERY 6 HOURS PRN
Qty: 20 TABLET | Refills: 0 | Status: SHIPPED | OUTPATIENT
Start: 2022-01-28 | End: 2022-10-17

## 2022-01-28 RX ORDER — HYDROXYZINE HYDROCHLORIDE 25 MG/1
25 TABLET, FILM COATED ORAL 3 TIMES DAILY PRN
Qty: 90 TABLET | Refills: 0 | Status: SHIPPED | OUTPATIENT
Start: 2022-01-28 | End: 2022-10-17

## 2022-01-28 ASSESSMENT — ANXIETY QUESTIONNAIRES
GAD7 TOTAL SCORE: 21
3. WORRYING TOO MUCH ABOUT DIFFERENT THINGS: NEARLY EVERY DAY
6. BECOMING EASILY ANNOYED OR IRRITABLE: NEARLY EVERY DAY
1. FEELING NERVOUS, ANXIOUS, OR ON EDGE: NEARLY EVERY DAY
7. FEELING AFRAID AS IF SOMETHING AWFUL MIGHT HAPPEN: NEARLY EVERY DAY
5. BEING SO RESTLESS THAT IT IS HARD TO SIT STILL: NEARLY EVERY DAY
2. NOT BEING ABLE TO STOP OR CONTROL WORRYING: NEARLY EVERY DAY

## 2022-01-28 ASSESSMENT — PATIENT HEALTH QUESTIONNAIRE - PHQ9
SUM OF ALL RESPONSES TO PHQ QUESTIONS 1-9: 17
5. POOR APPETITE OR OVEREATING: NEARLY EVERY DAY

## 2022-01-28 ASSESSMENT — PAIN SCALES - GENERAL: PAINLEVEL: NO PAIN (0)

## 2022-01-28 ASSESSMENT — MIFFLIN-ST. JEOR: SCORE: 1254.52

## 2022-01-28 NOTE — PROGRESS NOTES
Assessment & Plan     Situational anxiety  At this time she is mainly dealing with situational anxiety, discussed about how she can manage the current situation including talking with his treatment team to at least inform them of his comments even if they aren't allowed to talk to her about his current care, hopefully that will help prevent early discharge and may result in a hold on him.  We will try short course benzodiazepines, advised to take as needed sparingly and avoid driving/operating machinery on medication, can try the Hydroxyzine for the daytime, discussed side effects of both medication as well as risks with BZDs including overuse, dependency and resp. Depression. If this is becoming a longer term issue consider Trazodone for sleep and SSRI/SNRI management as well as psychotherapy.   - LORazepam (ATIVAN) 0.5 MG tablet; Take 1-2 tablets (0.5-1 mg) by mouth every 6 hours as needed for anxiety  - hydrOXYzine (ATARAX) 25 MG tablet; Take 1 tablet (25 mg) by mouth 3 times daily as needed for anxiety       Depression Screening Follow Up    PHQ 1/28/2022   PHQ-9 Total Score 17   Q9: Thoughts of better off dead/self-harm past 2 weeks Not at all       Return in about 10 days (around 2/7/2022) for Recheck.     Options for treatment and follow-up care were reviewed with the patient and/or guardian. Patient and/or guardian engaged in the decision making process and verbalized understanding of the options discussed and agreed with the final plan.     ALEXUS Lopez [facility] DOUGLAS Gonzalez is a 51 year old who presents for the following health issues     HPI     Abnormal Mood Symptoms  Onset/Duration: 1 week   Description: anxiety due to son   Depression (if yes, do PHQ-9): YES  Anxiety (if yes, do OBDULIA-7): YES  Accompanying Signs & Symptoms:  Still participating in activities that you used to enjoy: YES  Fatigue: YES  Irritability: YES  Difficulty concentrating: YES  Changes  in appetite: YES  Problems with sleep: YES  Heart racing/beating fast: YES  Abnormally elevated, expansive, or irritable mood: YES  Persistently increased activity or energy: no  Thoughts of hurting yourself or others: no  History:  Recent stress or major life event: YES  Prior depression or anxiety: possible temp   Family history of depression or anxiety: YES  Alcohol/drug use: YES  Difficulty sleeping: YES  Precipitating or alleviating factors: son   Therapies tried and outcome: none  PHQ 1/28/2022   PHQ-9 Total Score 17   Q9: Thoughts of better off dead/self-harm past 2 weeks Not at all     OBDULIA-7 SCORE 2/24/2012 1/28/2022   Total Score 12 -   Total Score - 21       Last PHQ-9 1/28/2022   1.  Little interest or pleasure in doing things 0   2.  Feeling down, depressed, or hopeless 3   3.  Trouble falling or staying asleep, or sleeping too much 3   4.  Feeling tired or having little energy 2   5.  Poor appetite or overeating 3   6.  Feeling bad about yourself 3   7.  Trouble concentrating 3   8.  Moving slowly or restless 0   Q9: Thoughts of better off dead/self-harm past 2 weeks 0   PHQ-9 Total Score 17   Difficulty at work, home, or with people Very difficult     OBDULIA-7  1/28/2022   1. Feeling nervous, anxious, or on edge 3   2. Not being able to stop or control worrying 3   3. Worrying too much about different things 3   4. Trouble relaxing 3   5. Being so restless that it is hard to sit still 3   6. Becoming easily annoyed or irritable 3   7. Feeling afraid, as if something awful might happen 3   OBDULIA-7 Total Score 21     One of her sons is really struggling with drug addiction and mental health and just this last week admitted he needed to be admitted to inpatient treatment. He is currently there but apparently he can have his phone still so he is sending messages to his mother all night saying things like his head isn't right, he is going to discharge himself and walk right out the door into traffic, that he  "wants to end his life, etc.  She doesn't know what to do, they did call to verify he was still admitted when he said he was getting discharged. This is causing her to lose sleep, she can't eat, she is anxious.     She has a history of some anxiety/depression in the past but nothing that was long lasting.      Review of Systems   Constitutional,  cardiovascular, pulmonary, gi and gu, psych systems are negative, except as otherwise noted.      Objective    /70   Pulse 98   Temp 97.3  F (36.3  C) (Temporal)   Resp 20   Ht 1.651 m (5' 5\")   Wt 64.9 kg (143 lb)   LMP 09/11/2013   SpO2 96%   BMI 23.80 kg/m    Body mass index is 23.8 kg/m .  Physical Exam   GENERAL: healthy and alert  MS: no gross musculoskeletal defects noted, no edema  NEURO: Normal strength and tone, mentation intact and speech normal  PSYCH: mentation appears normal, tearful, anxious, speech pressured, judgement and insight intact and appearance well groomed                "

## 2022-01-29 ASSESSMENT — ANXIETY QUESTIONNAIRES: GAD7 TOTAL SCORE: 21

## 2022-08-04 ENCOUNTER — ANCILLARY PROCEDURE (OUTPATIENT)
Dept: MAMMOGRAPHY | Facility: OTHER | Age: 52
End: 2022-08-04
Attending: NURSE PRACTITIONER
Payer: COMMERCIAL

## 2022-08-04 DIAGNOSIS — Z12.31 VISIT FOR SCREENING MAMMOGRAM: ICD-10-CM

## 2022-08-04 PROCEDURE — 77067 SCR MAMMO BI INCL CAD: CPT | Mod: TC | Performed by: RADIOLOGY

## 2022-08-04 PROCEDURE — 77063 BREAST TOMOSYNTHESIS BI: CPT | Mod: TC | Performed by: RADIOLOGY

## 2022-10-17 ENCOUNTER — OFFICE VISIT (OUTPATIENT)
Dept: FAMILY MEDICINE | Facility: CLINIC | Age: 52
End: 2022-10-17
Payer: COMMERCIAL

## 2022-10-17 VITALS
WEIGHT: 143 LBS | OXYGEN SATURATION: 98 % | HEIGHT: 65 IN | TEMPERATURE: 97.2 F | HEART RATE: 104 BPM | BODY MASS INDEX: 23.82 KG/M2 | SYSTOLIC BLOOD PRESSURE: 102 MMHG | DIASTOLIC BLOOD PRESSURE: 60 MMHG

## 2022-10-17 DIAGNOSIS — Z13.220 LIPID SCREENING: ICD-10-CM

## 2022-10-17 DIAGNOSIS — I10 ESSENTIAL HYPERTENSION: ICD-10-CM

## 2022-10-17 DIAGNOSIS — F41.8 SITUATIONAL ANXIETY: ICD-10-CM

## 2022-10-17 DIAGNOSIS — Z00.00 ROUTINE HISTORY AND PHYSICAL EXAMINATION OF ADULT: Primary | ICD-10-CM

## 2022-10-17 DIAGNOSIS — Z76.89 ENCOUNTER TO ESTABLISH CARE: ICD-10-CM

## 2022-10-17 LAB
ANION GAP SERPL CALCULATED.3IONS-SCNC: 4 MMOL/L (ref 3–14)
BUN SERPL-MCNC: 16 MG/DL (ref 7–30)
CALCIUM SERPL-MCNC: 9.2 MG/DL (ref 8.5–10.1)
CHLORIDE BLD-SCNC: 109 MMOL/L (ref 94–109)
CHOLEST SERPL-MCNC: 212 MG/DL
CO2 SERPL-SCNC: 27 MMOL/L (ref 20–32)
CREAT SERPL-MCNC: 0.79 MG/DL (ref 0.52–1.04)
FASTING STATUS PATIENT QL REPORTED: YES
GFR SERPL CREATININE-BSD FRML MDRD: 89 ML/MIN/1.73M2
GLUCOSE BLD-MCNC: 111 MG/DL (ref 70–99)
HDLC SERPL-MCNC: 57 MG/DL
LDLC SERPL CALC-MCNC: 98 MG/DL
NONHDLC SERPL-MCNC: 155 MG/DL
POTASSIUM BLD-SCNC: 4.6 MMOL/L (ref 3.4–5.3)
SODIUM SERPL-SCNC: 140 MMOL/L (ref 133–144)
TRIGL SERPL-MCNC: 285 MG/DL

## 2022-10-17 PROCEDURE — 80048 BASIC METABOLIC PNL TOTAL CA: CPT | Performed by: PHYSICIAN ASSISTANT

## 2022-10-17 PROCEDURE — 36415 COLL VENOUS BLD VENIPUNCTURE: CPT | Performed by: PHYSICIAN ASSISTANT

## 2022-10-17 PROCEDURE — 80061 LIPID PANEL: CPT | Performed by: PHYSICIAN ASSISTANT

## 2022-10-17 PROCEDURE — 99214 OFFICE O/P EST MOD 30 MIN: CPT | Mod: 25 | Performed by: PHYSICIAN ASSISTANT

## 2022-10-17 PROCEDURE — 99396 PREV VISIT EST AGE 40-64: CPT | Mod: 25 | Performed by: PHYSICIAN ASSISTANT

## 2022-10-17 PROCEDURE — 90471 IMMUNIZATION ADMIN: CPT | Performed by: PHYSICIAN ASSISTANT

## 2022-10-17 PROCEDURE — 90715 TDAP VACCINE 7 YRS/> IM: CPT | Performed by: PHYSICIAN ASSISTANT

## 2022-10-17 RX ORDER — HYDROXYZINE HYDROCHLORIDE 25 MG/1
25 TABLET, FILM COATED ORAL 3 TIMES DAILY PRN
Qty: 90 TABLET | Refills: 1 | Status: SHIPPED | OUTPATIENT
Start: 2022-10-17 | End: 2023-11-13

## 2022-10-17 RX ORDER — LISINOPRIL/HYDROCHLOROTHIAZIDE 10-12.5 MG
1 TABLET ORAL DAILY
Qty: 90 TABLET | Refills: 3 | Status: SHIPPED | OUTPATIENT
Start: 2022-10-17 | End: 2023-11-13

## 2022-10-17 RX ORDER — METOPROLOL SUCCINATE 100 MG/1
100 TABLET, EXTENDED RELEASE ORAL DAILY
Qty: 90 TABLET | Refills: 3 | Status: SHIPPED | OUTPATIENT
Start: 2022-10-17 | End: 2023-11-13

## 2022-10-17 RX ORDER — DILTIAZEM HYDROCHLORIDE 240 MG/1
240 CAPSULE, COATED, EXTENDED RELEASE ORAL DAILY
Qty: 90 CAPSULE | Refills: 3 | Status: SHIPPED | OUTPATIENT
Start: 2022-10-17 | End: 2023-11-13

## 2022-10-17 ASSESSMENT — ENCOUNTER SYMPTOMS
HEARTBURN: 0
EYE PAIN: 0
HEADACHES: 0
WEAKNESS: 0
SHORTNESS OF BREATH: 0
COUGH: 0
DIZZINESS: 0
PARESTHESIAS: 0
MYALGIAS: 0
CONSTIPATION: 0
ABDOMINAL PAIN: 0
FEVER: 0
NAUSEA: 0
CHILLS: 0
HEMATURIA: 0
FREQUENCY: 0
HEMATOCHEZIA: 0
PALPITATIONS: 0
BREAST MASS: 0
SORE THROAT: 0
DYSURIA: 0
JOINT SWELLING: 0
NERVOUS/ANXIOUS: 1
DIARRHEA: 0
ARTHRALGIAS: 0

## 2022-10-17 ASSESSMENT — PAIN SCALES - GENERAL: PAINLEVEL: NO PAIN (0)

## 2022-10-17 NOTE — PROGRESS NOTES
Prior to immunization administration, verified patients identity using patient s name and date of birth. Please see Immunization Activity for additional information.     Screening Questionnaire for Adult Immunization    Are you sick today?   No   Do you have allergies to medications, food, a vaccine component or latex?   No   Have you ever had a serious reaction after receiving a vaccination?   No   Do you have a long-term health problem with heart, lung, kidney, or metabolic disease (e.g., diabetes), asthma, a blood disorder, no spleen, complement component deficiency, a cochlear implant, or a spinal fluid leak?  Are you on long-term aspirin therapy?   No   Do you have cancer, leukemia, HIV/AIDS, or any other immune system problem?   No   Do you have a parent, brother, or sister with an immune system problem?   No   In the past 3 months, have you taken medications that affect  your immune system, such as prednisone, other steroids, or anticancer drugs; drugs for the treatment of rheumatoid arthritis, Crohn s disease, or psoriasis; or have you had radiation treatments?   No   Have you had a seizure, or a brain or other nervous system problem?   No   During the past year, have you received a transfusion of blood or blood    products, or been given immune (gamma) globulin or antiviral drug?   No   For women: Are you pregnant or is there a chance you could become       pregnant during the next month?   No   Have you received any vaccinations in the past 4 weeks?   No     Immunization questionnaire answers were all negative.        Per orders of Vanesa Thompson PA-C, injection of Tdap  given by Luana Valdes MA. Patient instructed to remain in clinic for 15 minutes afterwards, and to report any adverse reaction to me immediately.       Screening performed by Luana Valdes MA on 10/17/2022 at 8:47 AM.

## 2022-10-17 NOTE — PROGRESS NOTES
SUBJECTIVE:   CC: Lisa is an 51 year old who presents for preventive health visit.       Patient has been advised of split billing requirements and indicates understanding: Yes  Healthy Habits:     Getting at least 3 servings of Calcium per day:  Yes    Bi-annual eye exam:  NO    Dental care twice a year:  NO    Sleep apnea or symptoms of sleep apnea:  None    Diet:  Regular (no restrictions)    Frequency of exercise:  1 day/week    Duration of exercise:  Less than 15 minutes    Taking medications regularly:  Yes    Medication side effects:  None    PHQ-2 Total Score: 2    Additional concerns today:  No    Patient presents today for follow-up of blood pressure. Numbers have been well controlled. Tolerating medications well without side effects. Monitoring salt in diet. Patient denies headaches, vision changes, chest pain, shortness of breath or paresthesias.    Uses Hydroxyzine as needed for situational anxiety.     Today's PHQ-2 Score:   PHQ-2 ( 1999 [employer]) 10/17/2022   Q1: Little interest or pleasure in doing things 1   Q2: Feeling down, depressed or hopeless 1   PHQ-2 Score 2   PHQ-2 Total Score (12-17 Years)- Positive if 3 or more points; Administer PHQ-A if positive -   Q1: Little interest or pleasure in doing things Several days   Q2: Feeling down, depressed or hopeless Several days   PHQ-2 Score 2       Abuse: Current or Past (Physical, Sexual or Emotional) - No  Do you feel safe in your environment? Yes        Social History     Tobacco Use     Smoking status: Former     Types: Cigarettes     Quit date: 2/23/2012     Years since quitting: 10.6     Smokeless tobacco: Never   Substance Use Topics     Alcohol use: Yes     Comment: weekends         Alcohol Use 10/17/2022   Prescreen: >3 drinks/day or >7 drinks/week? No   Prescreen: >3 drinks/day or >7 drinks/week? -       Reviewed orders with patient.  Reviewed health maintenance and updated orders accordingly - Yes  BP Readings from Last 3 Encounters:    10/17/22 102/60   01/28/22 138/70   09/29/21 128/78    Wt Readings from Last 3 Encounters:   10/17/22 64.9 kg (143 lb)   01/28/22 64.9 kg (143 lb)   10/14/21 63.5 kg (140 lb)                  Patient Active Problem List   Diagnosis     Essential hypertension     S/P laparoscopic assisted vaginal hysterectomy (LAVH)     Astigmatism     Primary osteoarthritis of first carpometacarpal joint of left hand     Past Surgical History:   Procedure Laterality Date     BIOPSY BREAST Left 2009    benign needle bx     C THORACENTESIS,INSRT CHEST TUBE,PTX       COLONOSCOPY N/A 4/25/2019    Procedure: Colonoscopy;  Surgeon: Wellington Humphrey MD;  Location: [facility]     CYSTOSCOPY  10/14/2013    Procedure: CYSTOSCOPY;;  Surgeon: Elizabeth Arnold MD;  Location[facility]     HYSTERECTOMY, PAP NO LONGER INDICATED       LAPAROSCOPIC ASSISTED HYSTERECTOMY VAGINAL  10/14/2013    Procedure: LAPAROSCOPIC ASSISTED HYSTERECTOMY VAGINAL;  Laparoscopic Assisted VAginal Hysterectomy, cystoscopy.;  Surgeon: Elizabeth Arnold MD;  Location: [facility]     TONSILLECTOMY  05/08/2007     ZZC ECHO HEART XTHORACIC,COMPLETE, W/O DOPPLER  2004    stress echo, normal       Social History     Tobacco Use     Smoking status: Former     Types: Cigarettes     Quit date: 2/23/2012     Years since quitting: 10.6     Smokeless tobacco: Never   Substance Use Topics     Alcohol use: Yes     Comment: weekends     Family History   Problem Relation Age of Onset     Hypertension Father      Hypertension Mother      Heart Disease Mother      Diabetes Maternal Grandfather      Cerebrovascular Disease Maternal Aunt         4 strokes, 1st at age 36     Anesthesia Reaction No family hx of          Current Outpatient Medications   Medication Sig Dispense Refill     diltiazem ER COATED BEADS (CARDIZEM CD/CARTIA XT) 240 MG 24 hr capsule Take 1 capsule (240 mg) by mouth daily 90 capsule 3     hydrOXYzine (ATARAX) 25 MG tablet Take 1 tablet (25 mg) by mouth 3 times daily as needed  for anxiety 90 tablet 1     lisinopril-hydrochlorothiazide (ZESTORETIC) 10-12.5 MG tablet Take 1 tablet by mouth daily 90 tablet 3     metoprolol succinate ER (TOPROL XL) 100 MG 24 hr tablet Take 1 tablet (100 mg) by mouth daily 90 tablet 3     omeprazole (PRILOSEC) 40 MG DR capsule Take 1 capsule (40 mg) by mouth daily 60 capsule 0       Breast Cancer Screening:    Breast CA Risk Assessment (FHS-7) 9/27/2021   Do you have a family history of breast, colon, or ovarian cancer? No / Unknown       Mammogram Screening: Recommended annual mammography  Pertinent mammograms are reviewed under the imaging tab.    History of abnormal Pap smear: Status post benign hysterectomy. Health Maintenance and Surgical History updated.  PAP / HPV 4/8/2011 2/26/2010 2/13/2009   PAP (Historical) NIL NIL NIL     Reviewed and updated as needed this visit by clinical staff   Tobacco  Allergies  Meds   Med Hx  Surg Hx  Fam Hx  Soc Hx        Reviewed and updated as needed this visit by Provider                     Review of Systems   Constitutional: Negative for chills and fever.   HENT: Negative for congestion, ear pain, hearing loss and sore throat.    Eyes: Negative for pain and visual disturbance.   Respiratory: Negative for cough and shortness of breath.    Cardiovascular: Negative for chest pain, palpitations and peripheral edema.   Gastrointestinal: Negative for abdominal pain, constipation, diarrhea, heartburn, hematochezia and nausea.   Breasts:  Negative for tenderness, breast mass and discharge.   Genitourinary: Negative for dysuria, frequency, genital sores, hematuria, pelvic pain, urgency, vaginal bleeding and vaginal discharge.   Musculoskeletal: Negative for arthralgias, joint swelling and myalgias.   Skin: Negative for rash.   Neurological: Negative for dizziness, weakness, headaches and paresthesias.   Psychiatric/Behavioral: Negative for mood changes. The patient is nervous/anxious.         OBJECTIVE:   /60    "Pulse 104   Temp 97.2  F (36.2  C) (Temporal)   Ht 1.638 m (5' 4.5\")   Wt 64.9 kg (143 lb)   LMP 09/11/2013   SpO2 98%   BMI 24.17 kg/m    Physical Exam  GENERAL: healthy, alert and no distress  EYES: Eyes grossly normal to inspection, PERRL and conjunctivae and sclerae normal  HENT: ear canals and TM's normal, nose and mouth without ulcers or lesions  NECK: no adenopathy, no asymmetry, masses, or scars and thyroid normal to palpation  RESP: lungs clear to auscultation - no rales, rhonchi or wheezes  CV: regular rate and rhythm, normal S1 S2, no S3 or S4, no murmur, click or rub, no peripheral edema and peripheral pulses strong  ABDOMEN: soft, nontender, no hepatosplenomegaly, no masses and bowel sounds normal  MS: no gross musculoskeletal defects noted, no edema  SKIN: no suspicious lesions or rashes  NEURO: Normal strength and tone, mentation intact and speech normal  PSYCH: mentation appears normal, affect normal/bright    Diagnostic Test Results:  Labs reviewed in Epic    ASSESSMENT/PLAN:   (Z00.00) Routine history and physical examination of adult  (primary encounter diagnosis)    (Z76.89) Encounter to establish care  Comment: Health history and plan of care reviewed.     (I10) Essential hypertension  Comment: Blood pressure at goal, continue current medications without change.   Plan: BASIC METABOLIC PANEL, diltiazem ER COATED         BEADS (CARDIZEM CD/CARTIA XT) 240 MG 24 hr         capsule, lisinopril-hydrochlorothiazide         (ZESTORETIC) 10-12.5 MG tablet, metoprolol         succinate ER (TOPROL XL) 100 MG 24 hr tablet    (Z13.220) Lipid screening  Plan: Lipid panel reflex to direct LDL Fasting    (F41.8) Situational anxiety  Comment: Uses as needed.   Plan: hydrOXYzine (ATARAX) 25 MG tablet      Patient has been advised of split billing requirements and indicates understanding: Yes    COUNSELING:  Reviewed preventive health counseling, as reflected in patient instructions    Estimated body mass " "index is 24.17 kg/m  as calculated from the following:    Height as of this encounter: 1.638 m (5' 4.5\").    Weight as of this encounter: 64.9 kg (143 lb).      She reports that she quit smoking about 10 years ago. Her smoking use included cigarettes. She has never used smokeless tobacco.      Counseling Resources:  ATP IV Guidelines  Pooled Cohorts Equation Calculator  Breast Cancer Risk Calculator  BRCA-Related Cancer Risk Assessment: FHS-7 Tool  FRAX Risk Assessment  ICSI Preventive Guidelines  Dietary Guidelines for Americans, 2010  USDA's MyPlate  ASA Prophylaxis  Lung CA Screening    ALEXUS [facility]  "

## 2022-10-20 ENCOUNTER — OFFICE VISIT (OUTPATIENT)
Dept: ORTHOPEDICS | Facility: OTHER | Age: 52
End: 2022-10-20
Payer: COMMERCIAL

## 2022-10-20 VITALS — HEIGHT: 65 IN | WEIGHT: 143 LBS | RESPIRATION RATE: 18 BRPM | BODY MASS INDEX: 23.82 KG/M2

## 2022-10-20 DIAGNOSIS — M18.12 PRIMARY OSTEOARTHRITIS OF FIRST CARPOMETACARPAL JOINT OF LEFT HAND: Primary | ICD-10-CM

## 2022-10-20 PROCEDURE — 20600 DRAIN/INJ JOINT/BURSA W/O US: CPT | Mod: LT | Performed by: ORTHOPAEDIC SURGERY

## 2022-10-20 RX ORDER — TRIAMCINOLONE ACETONIDE 40 MG/ML
20 INJECTION, SUSPENSION INTRA-ARTICULAR; INTRAMUSCULAR
Status: SHIPPED | OUTPATIENT
Start: 2022-10-20

## 2022-10-20 RX ORDER — LIDOCAINE HYDROCHLORIDE 10 MG/ML
0.5 INJECTION, SOLUTION INFILTRATION; PERINEURAL
Status: SHIPPED | OUTPATIENT
Start: 2022-10-20

## 2022-10-20 RX ADMIN — LIDOCAINE HYDROCHLORIDE 0.5 ML: 10 INJECTION, SOLUTION INFILTRATION; PERINEURAL at 10:41

## 2022-10-20 RX ADMIN — TRIAMCINOLONE ACETONIDE 20 MG: 40 INJECTION, SUSPENSION INTRA-ARTICULAR; INTRAMUSCULAR at 10:41

## 2022-10-20 NOTE — LETTER
10/20/2022         RE: Lisa Urbina  [address] [facility] [place] [address]        Dear Colleague,    Thank you for referring your patient, Lisa Urbina, to the [facility] [place]. Please see a copy of my visit note below.    Small Joint Injection/Arthrocentesis: L thumb CMC    Date/Time: 10/20/2022 10:41 AM  Performed by: Sg Tai MD  Authorized by: Sg Tai MD   Indications:  Pain  Needle Size:  25 G  Guidance: landmark     Approach:  Radial  Location:  Thumb    Site:  L thumb CMC                    Medications:  20 mg triamcinolone 40 MG/ML; 0.5 mL lidocaine 1 %        Outcome:  Tolerated well, no immediate complications  Procedure discussed: discussed risks, benefits, and alternatives    Consent Given by:  Patient  Timeout: timeout called immediately prior to procedure    Prep: patient was prepped and draped in usual sterile fashion              Follow up left thumb carpometacarpal joint osteoarthritis.  Steroid injection 10/14/21 worked very well.  She desires repeat injection.  Risks, benefits, potential complications and alternatives were discussed.  With the patient's consent, we injected the left  thumb carpometacarpal with Kenalog 20 mg and lidocaine  after sterile prep.        Again, thank you for allowing me to participate in the care of your patient.        Sincerely,        Sg Tai MD

## 2022-10-20 NOTE — PROGRESS NOTES
Follow up left thumb carpometacarpal joint osteoarthritis.  Steroid injection 10/14/21 worked very well.  She desires repeat injection.  Risks, benefits, potential complications and alternatives were discussed.  With the patient's consent, we injected the left  thumb carpometacarpal with Kenalog 20 mg and lidocaine  after sterile prep.    
Small Joint Injection/Arthrocentesis: L thumb CMC    Date/Time: 10/20/2022 10:41 AM  Performed by: Sg Tai MD  Authorized by: Sg Tai MD   Indications:  Pain  Needle Size:  25 G  Guidance: landmark     Approach:  Radial  Location:  Thumb    Site:  L thumb CMC                    Medications:  20 mg triamcinolone 40 MG/ML; 0.5 mL lidocaine 1 %        Outcome:  Tolerated well, no immediate complications  Procedure discussed: discussed risks, benefits, and alternatives    Consent Given by:  Patient  Timeout: timeout called immediately prior to procedure    Prep: patient was prepped and draped in usual sterile fashion            
none

## 2022-11-07 ENCOUNTER — TELEPHONE (OUTPATIENT)
Dept: FAMILY MEDICINE | Facility: CLINIC | Age: 52
End: 2022-11-07

## 2023-06-01 ENCOUNTER — OFFICE VISIT (OUTPATIENT)
Dept: ORTHOPEDICS | Facility: OTHER | Age: 53
End: 2023-06-01
Payer: COMMERCIAL

## 2023-06-01 VITALS
WEIGHT: 143 LBS | SYSTOLIC BLOOD PRESSURE: 122 MMHG | HEIGHT: 65 IN | BODY MASS INDEX: 23.82 KG/M2 | RESPIRATION RATE: 18 BRPM | DIASTOLIC BLOOD PRESSURE: 78 MMHG | HEART RATE: 70 BPM

## 2023-06-01 DIAGNOSIS — M18.12 PRIMARY OSTEOARTHRITIS OF FIRST CARPOMETACARPAL JOINT OF LEFT HAND: Primary | ICD-10-CM

## 2023-06-01 PROCEDURE — 20600 DRAIN/INJ JOINT/BURSA W/O US: CPT | Mod: LT | Performed by: ORTHOPAEDIC SURGERY

## 2023-06-01 RX ORDER — LIDOCAINE HYDROCHLORIDE 10 MG/ML
0.5 INJECTION, SOLUTION INFILTRATION; PERINEURAL
Status: SHIPPED | OUTPATIENT
Start: 2023-06-01

## 2023-06-01 RX ORDER — TRIAMCINOLONE ACETONIDE 40 MG/ML
20 INJECTION, SUSPENSION INTRA-ARTICULAR; INTRAMUSCULAR
Status: SHIPPED | OUTPATIENT
Start: 2023-06-01

## 2023-06-01 RX ADMIN — LIDOCAINE HYDROCHLORIDE 0.5 ML: 10 INJECTION, SOLUTION INFILTRATION; PERINEURAL at 08:37

## 2023-06-01 RX ADMIN — TRIAMCINOLONE ACETONIDE 20 MG: 40 INJECTION, SUSPENSION INTRA-ARTICULAR; INTRAMUSCULAR at 08:37

## 2023-06-01 NOTE — PROGRESS NOTES
Small Joint Injection/Arthrocentesis: L thumb CMC    Date/Time: 6/1/2023 8:37 AM    Performed by: Sg Tai MD  Authorized by: Sg Tai MD  Indications:  Pain  Needle Size:  25 G  Guidance: landmark     Approach:  Radial  Location:  Thumb    Site:  L thumb CMC                    Medications:  20 mg triamcinolone 40 MG/ML; 0.5 mL lidocaine 1 %        Outcome:  Tolerated well, no immediate complications  Procedure discussed: discussed risks, benefits, and alternatives    Consent Given by:  Patient  Timeout: timeout called immediately prior to procedure    Prep: patient was prepped and draped in usual sterile fashion

## 2023-06-01 NOTE — PROGRESS NOTES
Follow up left thumb carpometacarpal joint osteoarthritis.  Steroid injection 10/20/22 worked very well.  She desires repeat injection.  Risks, benefits, potential complications and alternatives were discussed.  With the patient's consent, we injected the left  thumb carpometacarpal with Kenalog 20 mg and lidocaine  after sterile prep.

## 2023-06-01 NOTE — LETTER
6/1/2023         RE: Lisa Urbina  [address] [facility] [address]        Dear Colleague,    Thank you for referring your patient, Lisa Urbina, to the [facility] [place]. Please see a copy of my visit note below.    Small Joint Injection/Arthrocentesis: L thumb CMC    Date/Time: 6/1/2023 8:37 AM    Performed by: Sg Tai MD  Authorized by: Sg Tai MD  Indications:  Pain  Needle Size:  25 G  Guidance: landmark     Approach:  Radial  Location:  Thumb    Site:  L thumb CMC                    Medications:  20 mg triamcinolone 40 MG/ML; 0.5 mL lidocaine 1 %        Outcome:  Tolerated well, no immediate complications  Procedure discussed: discussed risks, benefits, and alternatives    Consent Given by:  Patient  Timeout: timeout called immediately prior to procedure    Prep: patient was prepped and draped in usual sterile fashion              Follow up left thumb carpometacarpal joint osteoarthritis.  Steroid injection 10/20/22 worked very well.  She desires repeat injection.  Risks, benefits, potential complications and alternatives were discussed.  With the patient's consent, we injected the left  thumb carpometacarpal with Kenalog 20 mg and lidocaine  after sterile prep.        Again, thank you for allowing me to participate in the care of your patient.        Sincerely,        Sg Tai MD

## 2023-07-05 ENCOUNTER — PATIENT OUTREACH (OUTPATIENT)
Dept: CARE COORDINATION | Facility: CLINIC | Age: 53
End: 2023-07-05
Payer: COMMERCIAL

## 2023-08-02 ENCOUNTER — PATIENT OUTREACH (OUTPATIENT)
Dept: CARE COORDINATION | Facility: CLINIC | Age: 53
End: 2023-08-02
Payer: COMMERCIAL

## 2023-08-17 ENCOUNTER — HOSPITAL ENCOUNTER (OUTPATIENT)
Dept: MAMMOGRAPHY | Facility: CLINIC | Age: 53
Discharge: HOME | End: 2023-08-17
Attending: PHYSICIAN ASSISTANT | Admitting: PHYSICIAN ASSISTANT
Payer: COMMERCIAL

## 2023-08-17 DIAGNOSIS — Z12.31 SCREENING MAMMOGRAM FOR HIGH-RISK PATIENT: ICD-10-CM

## 2023-08-17 PROCEDURE — 77067 SCR MAMMO BI INCL CAD: CPT

## 2023-09-15 ENCOUNTER — HOSPITAL ENCOUNTER (OUTPATIENT)
Dept: MAMMOGRAPHY | Facility: CLINIC | Age: 53
Discharge: HOME | End: 2023-09-15
Attending: PHYSICIAN ASSISTANT
Payer: COMMERCIAL

## 2023-09-15 DIAGNOSIS — R92.8 ABNORMAL MAMMOGRAM: ICD-10-CM

## 2023-09-15 PROCEDURE — 77061 BREAST TOMOSYNTHESIS UNI: CPT | Mod: RT

## 2023-11-07 ASSESSMENT — ENCOUNTER SYMPTOMS
HEMATOCHEZIA: 0
ABDOMINAL PAIN: 0
DYSURIA: 0
MYALGIAS: 0
PALPITATIONS: 0
PARESTHESIAS: 0
COUGH: 0
CONSTIPATION: 0
JOINT SWELLING: 0
NAUSEA: 0
SHORTNESS OF BREATH: 0
HEADACHES: 0
SORE THROAT: 0
EYE PAIN: 0
HEMATURIA: 0
WEAKNESS: 0
NERVOUS/ANXIOUS: 0
DIZZINESS: 0
CHILLS: 0
FREQUENCY: 0
HEARTBURN: 0
ARTHRALGIAS: 0
BREAST MASS: 0
FEVER: 0
DIARRHEA: 0

## 2023-11-13 ENCOUNTER — OFFICE VISIT (OUTPATIENT)
Dept: FAMILY MEDICINE | Facility: CLINIC | Age: 53
End: 2023-11-13
Payer: COMMERCIAL

## 2023-11-13 VITALS
HEIGHT: 65 IN | WEIGHT: 147 LBS | OXYGEN SATURATION: 96 % | BODY MASS INDEX: 24.49 KG/M2 | HEART RATE: 102 BPM | TEMPERATURE: 98.4 F | SYSTOLIC BLOOD PRESSURE: 120 MMHG | DIASTOLIC BLOOD PRESSURE: 64 MMHG

## 2023-11-13 DIAGNOSIS — Z00.00 ROUTINE GENERAL MEDICAL EXAMINATION AT A HEALTH CARE FACILITY: Primary | ICD-10-CM

## 2023-11-13 DIAGNOSIS — F41.8 SITUATIONAL ANXIETY: ICD-10-CM

## 2023-11-13 DIAGNOSIS — I10 ESSENTIAL HYPERTENSION: ICD-10-CM

## 2023-11-13 DIAGNOSIS — Z13.220 LIPID SCREENING: ICD-10-CM

## 2023-11-13 PROCEDURE — 99396 PREV VISIT EST AGE 40-64: CPT | Performed by: PHYSICIAN ASSISTANT

## 2023-11-13 RX ORDER — HYDROXYZINE HYDROCHLORIDE 25 MG/1
25 TABLET, FILM COATED ORAL 3 TIMES DAILY PRN
Qty: 90 TABLET | Refills: 4 | Status: SHIPPED | OUTPATIENT
Start: 2023-11-13

## 2023-11-13 RX ORDER — LISINOPRIL/HYDROCHLOROTHIAZIDE 10-12.5 MG
1 TABLET ORAL DAILY
Qty: 90 TABLET | Refills: 4 | Status: SHIPPED | OUTPATIENT
Start: 2023-11-13

## 2023-11-13 RX ORDER — DILTIAZEM HYDROCHLORIDE 240 MG/1
240 CAPSULE, COATED, EXTENDED RELEASE ORAL DAILY
Qty: 90 CAPSULE | Refills: 4 | Status: SHIPPED | OUTPATIENT
Start: 2023-11-13

## 2023-11-13 RX ORDER — METOPROLOL SUCCINATE 100 MG/1
100 TABLET, EXTENDED RELEASE ORAL DAILY
Qty: 90 TABLET | Refills: 4 | Status: SHIPPED | OUTPATIENT
Start: 2023-11-13

## 2023-11-13 ASSESSMENT — ENCOUNTER SYMPTOMS
FREQUENCY: 0
NERVOUS/ANXIOUS: 0
CONSTIPATION: 0
BREAST MASS: 0
DIARRHEA: 0
DIZZINESS: 0
COUGH: 0
ARTHRALGIAS: 0
NAUSEA: 0
PALPITATIONS: 0
EYE PAIN: 0
DYSURIA: 0
MYALGIAS: 0
HEARTBURN: 0
SHORTNESS OF BREATH: 0
SORE THROAT: 0
PARESTHESIAS: 0
HEADACHES: 0
WEAKNESS: 0
JOINT SWELLING: 0
CHILLS: 0
HEMATOCHEZIA: 0
HEMATURIA: 0
FEVER: 0
ABDOMINAL PAIN: 0

## 2023-11-13 ASSESSMENT — PAIN SCALES - GENERAL: PAINLEVEL: NO PAIN (0)

## 2023-11-13 NOTE — PROGRESS NOTES
SUBJECTIVE:   CC: Lisa is an 52 year old who presents for preventive health visit.       2023    12:57 PM   Additional Questions   Roomed by Kierra GILLESPIE       Healthy Habits:     Getting at least 3 servings of Calcium per day:  Yes    Bi-annual eye exam:  NO    Dental care twice a year:  NO    Sleep apnea or symptoms of sleep apnea:  None    Diet:  Regular (no restrictions)    Frequency of exercise:  None    Taking medications regularly:  Yes    Medication side effects:  None    Additional concerns today:  No    Patient presents today for follow-up of blood pressure. Numbers have been well controlled. Tolerating medications well without side effects. Monitoring salt in diet. Patient denies headaches, vision changes, chest pain, shortness of breath or paresthesias.    Anxiety has been higher. Son bought a Percocet pill that was instead Fentanyl and  instantly. Did not really know he was struggling with this. Coping as well as she can. Working and staying busy.     Today's PHQ-2 Score:       2023     1:09 PM   PHQ-2 (  [employer])   Q1: Little interest or pleasure in doing things 0   Q2: Feeling down, depressed or hopeless 0   PHQ-2 Score 0   Q1: Little interest or pleasure in doing things Not at all   Q2: Feeling down, depressed or hopeless Not at all   PHQ-2 Score 0       Social History     Tobacco Use    Smoking status: Former     Types: Cigarettes     Quit date: 2012     Years since quittin.7    Smokeless tobacco: Never   Substance Use Topics    Alcohol use: Yes     Comment: weekends             2023    12:34 PM   Alcohol Use   Prescreen: >3 drinks/day or >7 drinks/week? No     Reviewed orders with patient.  Reviewed health maintenance and updated orders accordingly - Yes  BP Readings from Last 3 Encounters:   23 120/64   23 122/78   10/17/22 102/60    Wt Readings from Last 3 Encounters:   23 66.7 kg (147 lb)   23 64.9 kg (143 lb)   10/20/22 64.9 kg (143  lb)                  Patient Active Problem List   Diagnosis    Essential hypertension    S/P laparoscopic assisted vaginal hysterectomy (LAVH)    Astigmatism    Primary osteoarthritis of first carpometacarpal joint of left hand     Past Surgical History:   Procedure Laterality Date    BIOPSY BREAST Left 2009    benign needle bx    C THORACENTESIS,INSRT CHEST TUBE,PTX      COLONOSCOPY N/A 2019    Procedure: Colonoscopy;  Surgeon: Wellington Humphrey MD;  [facility]    CYSTOSCOPY  10/14/2013    Procedure: CYSTOSCOPY;;  Surgeon: Elizabeth Arnold MD;  [facility]    HYSTERECTOMY, PAP NO LONGER INDICATED      LAPAROSCOPIC ASSISTED HYSTERECTOMY VAGINAL  10/14/2013    Procedure: LAPAROSCOPIC ASSISTED HYSTERECTOMY VAGINAL;  Laparoscopic Assisted VAginal Hysterectomy, cystoscopy.;  Surgeon: Elizabeth Arnold MD;  Location: [facility]    TONSILLECTOMY  2007    ZZC ECHO HEART XTHORACIC,COMPLETE, W/O DOPPLER      stress echo, normal       Social History     Tobacco Use    Smoking status: Former     Types: Cigarettes     Quit date: 2012     Years since quittin.7    Smokeless tobacco: Never   Substance Use Topics    Alcohol use: Yes     Comment: weekends     Family History   Problem Relation Age of Onset    Hypertension Father     Hypertension Mother     Heart Disease Mother     Anesthesia Reaction Mother         swelling    Diabetes Maternal Grandfather     Cerebrovascular Disease Maternal Aunt         4 strokes, 1st at age 36         Current Outpatient Medications   Medication Sig Dispense Refill    diltiazem ER COATED BEADS (CARDIZEM CD/CARTIA XT) 240 MG 24 hr capsule Take 1 capsule (240 mg) by mouth daily 90 capsule 4    hydrOXYzine (ATARAX) 25 MG tablet Take 1 tablet (25 mg) by mouth 3 times daily as needed for anxiety 90 tablet 4    lisinopril-hydrochlorothiazide (ZESTORETIC) 10-12.5 MG tablet Take 1 tablet by mouth daily 90 tablet 4    metoprolol succinate ER (TOPROL XL) 100 MG 24 hr tablet  "Take 1 tablet (100 mg) by mouth daily 90 tablet 4       Breast Cancer Screenin/27/2021     8:51 AM   Breast CA Risk Assessment (FHS-7)   Do you have a family history of breast, colon, or ovarian cancer? No / Unknown       Mammogram Screening: Recommended annual mammography  Pertinent mammograms are reviewed under the imaging tab.    History of abnormal Pap smear: Status post benign hysterectomy. Health Maintenance and Surgical History updated.      2011    12:00 AM 2010    12:00 AM 2009    12:00 AM   PAP / HPV   PAP (Historical) NIL  NIL  NIL      Reviewed and updated as needed this visit by clinical staff   Tobacco  Allergies  Meds              Reviewed and updated as needed this visit by Provider                     Review of Systems   Constitutional:  Negative for chills and fever.   HENT:  Negative for congestion, ear pain, hearing loss and sore throat.    Eyes:  Negative for pain and visual disturbance.   Respiratory:  Negative for cough and shortness of breath.    Cardiovascular:  Negative for chest pain, palpitations and peripheral edema.   Gastrointestinal:  Negative for abdominal pain, constipation, diarrhea, heartburn, hematochezia and nausea.   Breasts:  Negative for tenderness, breast mass and discharge.   Genitourinary:  Negative for dysuria, frequency, genital sores, hematuria, pelvic pain, urgency, vaginal bleeding and vaginal discharge.   Musculoskeletal:  Negative for arthralgias, joint swelling and myalgias.   Skin:  Negative for rash.   Neurological:  Negative for dizziness, weakness, headaches and paresthesias.   Psychiatric/Behavioral:  Negative for mood changes. The patient is not nervous/anxious.         OBJECTIVE:   /64   Pulse 102   Temp 98.4  F (36.9  C) (Temporal)   Ht 1.66 m (5' 5.35\")   Wt 66.7 kg (147 lb)   LMP 2013   SpO2 96%   BMI 24.20 kg/m    Physical Exam  GENERAL: healthy, alert and no distress  EYES: Eyes grossly normal to " inspection, PERRL and conjunctivae and sclerae normal  HENT: ear canals and TM's normal, nose and mouth without ulcers or lesions  NECK: no adenopathy, no asymmetry, masses, or scars and thyroid normal to palpation  RESP: lungs clear to auscultation - no rales, rhonchi or wheezes  CV: regular rate and rhythm, normal S1 S2, no S3 or S4, no murmur, click or rub, no peripheral edema and peripheral pulses strong  ABDOMEN: soft, nontender, no hepatosplenomegaly, no masses and bowel sounds normal  MS: no gross musculoskeletal defects noted, no edema  SKIN: no suspicious lesions or rashes  NEURO: Normal strength and tone, mentation intact and speech normal  PSYCH: mentation appears normal, affect normal/bright    Diagnostic Test Results:  Labs reviewed in Epic    ASSESSMENT/PLAN:   (Z00.00) Routine general medical examination at a health care facility  (primary encounter diagnosis)  Comment: Will have vaccinations updated while at work.     (I10) Essential hypertension  Comment: stable. Continue without change.   Plan: BASIC METABOLIC PANEL,         lisinopril-hydrochlorothiazide (ZESTORETIC)         10-12.5 MG tablet, metoprolol succinate ER         (TOPROL XL) 100 MG 24 hr tablet, diltiazem ER         COATED BEADS (CARDIZEM CD/CARTIA XT) 240 MG 24         hr capsule          (F41.8) Situational anxiety  Comment: Doing OK - increase in anxiety with son's death but doing OK.   Plan: hydrOXYzine (ATARAX) 25 MG tablet          (Z13.220) Lipid screening  Plan: Lipid panel reflex to direct LDL Fasting    Patient has been advised of split billing requirements and indicates understanding: Yes      COUNSELING:  Reviewed preventive health counseling, as reflected in patient instructions        She reports that she quit smoking about 11 years ago. Her smoking use included cigarettes. She has never used smokeless tobacco.          ALEXUS Green [facility]

## 2023-11-14 ENCOUNTER — LAB (OUTPATIENT)
Dept: LAB | Facility: OTHER | Age: 53
End: 2023-11-14
Payer: COMMERCIAL

## 2023-11-14 DIAGNOSIS — I10 ESSENTIAL HYPERTENSION: ICD-10-CM

## 2023-11-14 DIAGNOSIS — Z13.220 LIPID SCREENING: ICD-10-CM

## 2023-11-14 LAB
ANION GAP SERPL CALCULATED.3IONS-SCNC: 13 MMOL/L (ref 7–15)
BUN SERPL-MCNC: 13 MG/DL (ref 6–20)
CALCIUM SERPL-MCNC: 9.1 MG/DL (ref 8.6–10)
CHLORIDE SERPL-SCNC: 102 MMOL/L (ref 98–107)
CHOLEST SERPL-MCNC: 204 MG/DL
CREAT SERPL-MCNC: 0.9 MG/DL (ref 0.51–0.95)
DEPRECATED HCO3 PLAS-SCNC: 25 MMOL/L (ref 22–29)
EGFRCR SERPLBLD CKD-EPI 2021: 76 ML/MIN/1.73M2
GLUCOSE SERPL-MCNC: 103 MG/DL (ref 70–99)
HDLC SERPL-MCNC: 68 MG/DL
LDLC SERPL CALC-MCNC: 106 MG/DL
NONHDLC SERPL-MCNC: 136 MG/DL
POTASSIUM SERPL-SCNC: 4.1 MMOL/L (ref 3.4–5.3)
SODIUM SERPL-SCNC: 140 MMOL/L (ref 135–145)
TRIGL SERPL-MCNC: 151 MG/DL

## 2023-11-14 PROCEDURE — 80048 BASIC METABOLIC PNL TOTAL CA: CPT

## 2023-11-14 PROCEDURE — 80061 LIPID PANEL: CPT

## 2023-11-14 PROCEDURE — 36415 COLL VENOUS BLD VENIPUNCTURE: CPT

## 2024-08-16 ENCOUNTER — PATIENT OUTREACH (OUTPATIENT)
Dept: CARE COORDINATION | Facility: CLINIC | Age: 54
End: 2024-08-16
Payer: COMMERCIAL

## 2024-09-13 ENCOUNTER — PATIENT OUTREACH (OUTPATIENT)
Dept: CARE COORDINATION | Facility: CLINIC | Age: 54
End: 2024-09-13
Payer: COMMERCIAL

## 2024-09-18 ENCOUNTER — ANCILLARY PROCEDURE (OUTPATIENT)
Dept: MAMMOGRAPHY | Facility: OTHER | Age: 54
End: 2024-09-18
Attending: PHYSICIAN ASSISTANT
Payer: COMMERCIAL

## 2024-09-18 DIAGNOSIS — Z12.31 VISIT FOR SCREENING MAMMOGRAM: ICD-10-CM

## 2024-09-18 PROCEDURE — 77063 BREAST TOMOSYNTHESIS BI: CPT | Mod: TC | Performed by: RADIOLOGY

## 2024-09-18 PROCEDURE — 77067 SCR MAMMO BI INCL CAD: CPT | Mod: TC | Performed by: RADIOLOGY

## 2024-10-21 SDOH — HEALTH STABILITY: PHYSICAL HEALTH: ON AVERAGE, HOW MANY DAYS PER WEEK DO YOU ENGAGE IN MODERATE TO STRENUOUS EXERCISE (LIKE A BRISK WALK)?: 2 DAYS

## 2024-10-21 SDOH — HEALTH STABILITY: PHYSICAL HEALTH: ON AVERAGE, HOW MANY MINUTES DO YOU ENGAGE IN EXERCISE AT THIS LEVEL?: 10 MIN

## 2024-10-21 ASSESSMENT — SOCIAL DETERMINANTS OF HEALTH (SDOH): HOW OFTEN DO YOU GET TOGETHER WITH FRIENDS OR RELATIVES?: NEVER

## 2024-10-23 ENCOUNTER — ALLIED HEALTH/NURSE VISIT (OUTPATIENT)
Dept: FAMILY MEDICINE | Facility: OTHER | Age: 54
End: 2024-10-23
Payer: COMMERCIAL

## 2024-10-23 ENCOUNTER — NURSE TRIAGE (OUTPATIENT)
Dept: FAMILY MEDICINE | Facility: CLINIC | Age: 54
End: 2024-10-23
Payer: COMMERCIAL

## 2024-10-23 VITALS
HEART RATE: 103 BPM | DIASTOLIC BLOOD PRESSURE: 91 MMHG | OXYGEN SATURATION: 98 % | RESPIRATION RATE: 20 BRPM | SYSTOLIC BLOOD PRESSURE: 163 MMHG

## 2024-10-23 DIAGNOSIS — I10 ESSENTIAL HYPERTENSION: Primary | ICD-10-CM

## 2024-10-23 PROCEDURE — 99207 EKG 12-LEAD COMPLETE W/READ - CLINICS: CPT | Performed by: PHYSICIAN ASSISTANT

## 2024-10-23 PROCEDURE — 99207 PR NO CHARGE NURSE ONLY: CPT

## 2024-10-23 ASSESSMENT — PAIN SCALES - GENERAL: PAINLEVEL_OUTOF10: MILD PAIN (3)

## 2024-10-23 NOTE — PROGRESS NOTES
This RN did take over for other RN.  Previous RN did get review with   Dr. Rosales and brought him to room. See triage encounter.     Beth Neil RN on 10/23/2024 at 3:21 PM

## 2024-10-23 NOTE — TELEPHONE ENCOUNTER
"  S: High blood pressure, dizziness, shortness of breath, pounding heart     B: Patient presented to nurses office with complaints that at about 1230pm she noted that she had a sudden onset of dizziness, shortness of breath, pounding heart. Symptom's lasted about 30 min. Patient had to sit down d/t symptom's. After symptom's mostly resolved, she noted she had a headache. Headache's are unusual for her. It is the top of her head. Rubbing her head makes it feel better. Blood pressure was 166/95 .  Recheck 163/91. Denies she had chest pain or shortness of breath. But felt \"off\". She does state she has history of anxiety and was feeling very anxious. Is still feeling anxious.  She did take an 81mg ASA around 1pm. When writer entered other nurses office, patient's face was noted to be quite red.  We then went to another room and first nurse went to huddle with provider.      B/p recheck was 171/92, .  Patient reports she did take her Diltiazem and Lisinopril/hydrochlorothiazide this morning.  She takes Toprol XL at HS. She has not missed any doses. She has her annual exam with MARIAMA Anton tomorrow morning.  Patient lacey any chest pain or shortness of breath at this time. Other symptom's are still resolved. Still has a headache, rates 2/10. Denies any vision changes. No left arm pain, jaw pain or back pain. Dr. Rosales did enter room and discuss with patient.      Per VORB by Dr. Rosales - to have EKG done.     EKG completed and shown to Dr. Rosales.  Per VORB by Bobby - Take Tylenol for headache. Take it easy today. Go to ED for red flag symptom's. See PCP in am as scheduled. Do NOT take any NSAID's or any more ASA.      Reviewed these orders from provider. Patient verbalized understanding and is agreeable. Denies any other questions or concerns at this time.              Reason for Disposition   Systolic BP >= 160 OR Diastolic >= 100    Additional Information   Negative: Sounds like a " life-threatening emergency to the triager   Negative: Symptom is main concern (e.g., headache, chest pain)   Negative: Low blood pressure is main concern    Protocols used: Blood Pressure - High-A-OH  Per RN Visit notes:     Patient reports around 12:30 pm she developed sudden onset of dizziness, shortness of breath and heart pounding, symptoms lasted about 30 minutes. Once that resolved she developed a headache, which continues at a 2/10. Denies chest pain and SOB at this time.     Patient reports she is on medications for her blood pressure, which she did take this morning. Took an 81mg Aspirin around 1pm today.     Patient has taken all medications as per usual regimen: Yes  Patient reports tolerating them without any issues or concerns: Yes    Vitals:    10/23/24 1400 10/23/24 1415   BP: (!) 166/95 (!) 163/91   BP Location: Left arm    Patient Position: Sitting    Pulse: 106 103   Resp: 20    SpO2: 98% 98%       After 5 minutes, the patient's blood pressure remained greater than or equal to 140/90.    Is the patient currently having any chest pain? No  Does the patient currently have a headache? Yes: Pain Description: 2/10.  Does the patient currently have any vision changes? No  Does the patient currently have any nausea? No  Does the patient currently have any abdominal pain? No

## 2024-10-23 NOTE — TELEPHONE ENCOUNTER
Reason for Disposition    Patient sounds very sick or weak to the triager    Additional Information    Negative: Systolic BP >= 160 OR Diastolic >= 100, and any cardiac (e.g., breathing difficulty, chest pain) or neurologic symptoms (e.g., new-onset blurred or double vision)    Negative: Pregnant 20 or more weeks (or postpartum < 6 weeks) with new hand or face swelling    Negative: Pregnant 20 or more weeks (or postpartum < 6 weeks) and Systolic BP >= 160 OR Diastolic >= 110    Protocols used: Blood Pressure - High-A-OH

## 2024-10-23 NOTE — PROGRESS NOTES
Patient reports around 12:30 pm she developed sudden onset of dizziness, shortness of breath and heart pounding, symptoms lasted about 30 minutes. Once that resolved she developed a headache, which continues at a 2/10. Denies chest pain and SOB at this time.     Patient reports she is on medications for her blood pressure, which she did take this morning. Took an 81mg Aspirin around 1pm today.     Patient has taken all medications as per usual regimen: Yes  Patient reports tolerating them without any issues or concerns: Yes    Vitals:    10/23/24 1400 10/23/24 1415   BP: (!) 166/95 (!) 163/91   BP Location: Left arm    Patient Position: Sitting    Pulse: 106 103   Resp: 20    SpO2: 98% 98%       After 5 minutes, the patient's blood pressure remained greater than or equal to 140/90.    Is the patient currently having any chest pain? No  Does the patient currently have a headache? Yes: Pain Description: 2/10.  Does the patient currently have any vision changes? No  Does the patient currently have any nausea? No  Does the patient currently have any abdominal pain? No

## 2024-10-24 ENCOUNTER — OFFICE VISIT (OUTPATIENT)
Dept: FAMILY MEDICINE | Facility: CLINIC | Age: 54
End: 2024-10-24
Payer: COMMERCIAL

## 2024-10-24 VITALS
BODY MASS INDEX: 25.51 KG/M2 | DIASTOLIC BLOOD PRESSURE: 90 MMHG | SYSTOLIC BLOOD PRESSURE: 144 MMHG | OXYGEN SATURATION: 97 % | HEART RATE: 96 BPM | WEIGHT: 155 LBS | RESPIRATION RATE: 18 BRPM

## 2024-10-24 DIAGNOSIS — R07.89 ATYPICAL CHEST PAIN: ICD-10-CM

## 2024-10-24 DIAGNOSIS — Z00.00 ROUTINE GENERAL MEDICAL EXAMINATION AT A HEALTH CARE FACILITY: Primary | ICD-10-CM

## 2024-10-24 DIAGNOSIS — F41.8 SITUATIONAL ANXIETY: ICD-10-CM

## 2024-10-24 DIAGNOSIS — Z13.220 SCREENING FOR LIPOID DISORDERS: ICD-10-CM

## 2024-10-24 DIAGNOSIS — I10 ESSENTIAL HYPERTENSION: ICD-10-CM

## 2024-10-24 DIAGNOSIS — Z13.29 SCREENING FOR THYROID DISORDER: ICD-10-CM

## 2024-10-24 LAB
ALBUMIN SERPL BCG-MCNC: 4.5 G/DL (ref 3.5–5.2)
ALP SERPL-CCNC: 117 U/L (ref 40–150)
ALT SERPL W P-5'-P-CCNC: 22 U/L (ref 0–50)
ANION GAP SERPL CALCULATED.3IONS-SCNC: 13 MMOL/L (ref 7–15)
AST SERPL W P-5'-P-CCNC: 21 U/L (ref 0–45)
BILIRUB SERPL-MCNC: 0.3 MG/DL
BUN SERPL-MCNC: 11.2 MG/DL (ref 6–20)
CALCIUM SERPL-MCNC: 9.7 MG/DL (ref 8.8–10.4)
CHLORIDE SERPL-SCNC: 104 MMOL/L (ref 98–107)
CHOLEST SERPL-MCNC: 230 MG/DL
CREAT SERPL-MCNC: 0.84 MG/DL (ref 0.51–0.95)
EGFRCR SERPLBLD CKD-EPI 2021: 82 ML/MIN/1.73M2
ERYTHROCYTE [DISTWIDTH] IN BLOOD BY AUTOMATED COUNT: 12.1 % (ref 10–15)
FASTING STATUS PATIENT QL REPORTED: YES
FASTING STATUS PATIENT QL REPORTED: YES
GLUCOSE SERPL-MCNC: 104 MG/DL (ref 70–99)
HCO3 SERPL-SCNC: 26 MMOL/L (ref 22–29)
HCT VFR BLD AUTO: 39.8 % (ref 35–47)
HDLC SERPL-MCNC: 57 MG/DL
HGB BLD-MCNC: 13.5 G/DL (ref 11.7–15.7)
LDLC SERPL CALC-MCNC: 132 MG/DL
MCH RBC QN AUTO: 30.9 PG (ref 26.5–33)
MCHC RBC AUTO-ENTMCNC: 33.9 G/DL (ref 31.5–36.5)
MCV RBC AUTO: 91 FL (ref 78–100)
NONHDLC SERPL-MCNC: 173 MG/DL
PLATELET # BLD AUTO: 356 10E3/UL (ref 150–450)
POTASSIUM SERPL-SCNC: 4.2 MMOL/L (ref 3.4–5.3)
PROT SERPL-MCNC: 7.8 G/DL (ref 6.4–8.3)
RBC # BLD AUTO: 4.37 10E6/UL (ref 3.8–5.2)
SODIUM SERPL-SCNC: 143 MMOL/L (ref 135–145)
TRIGL SERPL-MCNC: 206 MG/DL
TROPONIN T SERPL HS-MCNC: <6 NG/L
TSH SERPL DL<=0.005 MIU/L-ACNC: 1.81 UIU/ML (ref 0.3–4.2)
WBC # BLD AUTO: 7.8 10E3/UL (ref 4–11)

## 2024-10-24 PROCEDURE — 84443 ASSAY THYROID STIM HORMONE: CPT | Performed by: PHYSICIAN ASSISTANT

## 2024-10-24 PROCEDURE — 80053 COMPREHEN METABOLIC PANEL: CPT | Performed by: PHYSICIAN ASSISTANT

## 2024-10-24 PROCEDURE — 80061 LIPID PANEL: CPT | Performed by: PHYSICIAN ASSISTANT

## 2024-10-24 PROCEDURE — 99213 OFFICE O/P EST LOW 20 MIN: CPT | Mod: 25 | Performed by: PHYSICIAN ASSISTANT

## 2024-10-24 PROCEDURE — 85027 COMPLETE CBC AUTOMATED: CPT | Performed by: PHYSICIAN ASSISTANT

## 2024-10-24 PROCEDURE — 99396 PREV VISIT EST AGE 40-64: CPT | Performed by: PHYSICIAN ASSISTANT

## 2024-10-24 PROCEDURE — 36415 COLL VENOUS BLD VENIPUNCTURE: CPT | Performed by: PHYSICIAN ASSISTANT

## 2024-10-24 PROCEDURE — 84484 ASSAY OF TROPONIN QUANT: CPT | Performed by: PHYSICIAN ASSISTANT

## 2024-10-24 RX ORDER — DILTIAZEM HYDROCHLORIDE 240 MG/1
240 CAPSULE, COATED, EXTENDED RELEASE ORAL DAILY
Qty: 90 CAPSULE | Refills: 4 | Status: SHIPPED | OUTPATIENT
Start: 2024-10-24

## 2024-10-24 RX ORDER — DILTIAZEM HYDROCHLORIDE EXTENDED-RELEASE TABLETS 360 MG/1
360 TABLET, EXTENDED RELEASE ORAL DAILY
Qty: 90 TABLET | Refills: 3 | Status: CANCELLED | OUTPATIENT
Start: 2024-10-24

## 2024-10-24 RX ORDER — HYDROXYZINE HYDROCHLORIDE 25 MG/1
25 TABLET, FILM COATED ORAL 3 TIMES DAILY PRN
Qty: 90 TABLET | Refills: 4 | Status: SHIPPED | OUTPATIENT
Start: 2024-10-24

## 2024-10-24 RX ORDER — METOPROLOL SUCCINATE 100 MG/1
100 TABLET, EXTENDED RELEASE ORAL DAILY
Qty: 90 TABLET | Refills: 4 | Status: SHIPPED | OUTPATIENT
Start: 2024-10-24

## 2024-10-24 RX ORDER — LISINOPRIL AND HYDROCHLOROTHIAZIDE 10; 12.5 MG/1; MG/1
2 TABLET ORAL DAILY
Qty: 180 TABLET | Refills: 4 | Status: SHIPPED | OUTPATIENT
Start: 2024-10-24

## 2024-10-24 ASSESSMENT — PAIN SCALES - GENERAL: PAINLEVEL_OUTOF10: MILD PAIN (2)

## 2024-10-24 NOTE — TELEPHONE ENCOUNTER
Patient would like flu shot added it immunization list.    Claudio Erazo, BSN, RN, PHN  [facility] ~ Registered Nurse  Clinic Triage ~ [place] [facility]  November 7, 2022     verbal cues/nonverbal cues (demo/gestures)/1 person assist

## 2024-10-24 NOTE — PROGRESS NOTES
Preventive Care Visit  [facility]  Vanesa Thompson PA-C, Family Medicine  Oct 24, 2024        Subjective   Lisa is a 53 year old, presenting for the following:  Physical        10/24/2024     8:35 AM   Additional Questions   Roomed by Vanesa BEJARANO          HPI  Concerned about high blood pressure that started again yesterday - she has been on the same medication for many years without change. Cannot think of anything different. Had a bad headache and felt dizzy yesterday. Did have an EKG which was normal. She still has a headache today. Blood pressure elevated but not as high. She has not had anything to eat. Worries about her heart. Feels heart beating in her neck and head. No true chest pain.     Otherwise has been doing great.     Health Care Directive  Patient does not have a Health Care Directive: Discussed advance care planning with patient; information given to patient to review.      10/21/2024   General Health   How would you rate your overall physical health? Good   Feel stress (tense, anxious, or unable to sleep) Rather much      (!) STRESS CONCERN      10/21/2024   Nutrition   Three or more servings of calcium each day? Yes   Diet: Regular (no restrictions)   How many servings of fruit and vegetables per day? (!) 0-1   How many sweetened beverages each day? 0-1            10/21/2024   Exercise   Days per week of moderate/strenous exercise 2 days   Average minutes spent exercising at this level 10 min      (!) EXERCISE CONCERN      10/21/2024   Social Factors   Frequency of gathering with friends or relatives Never   Worry food won't last until get money to buy more No   Food not last or not have enough money for food? No   Do you have housing? (Housing is defined as stable permanent housing and does not include staying ouside in a car, in a tent, in an abandoned building, in an overnight shelter, or couch-surfing.) Yes   Are you worried about losing your housing? No   Lack of  transportation? No   Unable to get utilities (heat,electricity)? No      (!) SOCIAL CONNECTIONS CONCERN      10/21/2024   Fall Risk   Fallen 2 or more times in the past year? No    Trouble with walking or balance? No        Patient-reported          10/21/2024   Dental   Dentist two times every year? (!) NO            10/21/2024   TB Screening   Were you born outside of the [place]? No            Today's PHQ-2 Score:       10/23/2024    10:03 AM   PHQ-2 (  [employer])   Q1: Little interest or pleasure in doing things 0    Q2: Feeling down, depressed or hopeless 0    PHQ-2 Score 0    Q1: Little interest or pleasure in doing things Not at all   Q2: Feeling down, depressed or hopeless Not at all   PHQ-2 Score 0       Patient-reported           10/21/2024   Substance Use   Alcohol more than 3/day or more than 7/wk No   Do you use any other substances recreationally? No        Social History     Tobacco Use    Smoking status: Former     Current packs/day: 0.00     Types: Cigarettes     Quit date: 2012     Years since quittin.6    Smokeless tobacco: Never   Vaping Use    Vaping status: Never Used   Substance Use Topics    Alcohol use: Yes     Comment: weekends    Drug use: No           2024   LAST FHS-7 RESULTS   1st degree relative breast or ovarian cancer No   Any relative bilateral breast cancer No   Any male have breast cancer No   Any ONE woman have BOTH breast AND ovarian cancer No   Any woman with breast cancer before 50yrs No   2 or more relatives with breast AND/OR ovarian cancer No   2 or more relatives with breast AND/OR bowel cancer No           Mammogram Screening - Mammogram every 1-2 years updated in Health Maintenance based on mutual decision making        10/21/2024   STI Screening   New sexual partner(s) since last STI/HIV test? No        History of abnormal Pap smear: Status post hysterectomy with removal of cervix and no history of CIN2 or greater or cervical cancer. Health Maintenance and  Surgical History updated.        2011    12:00 AM 2010    12:00 AM 2009    12:00 AM   PAP / HPV   PAP (Historical) NIL  NIL  NIL      ASCVD Risk   The 10-year ASCVD risk score (Lynn JIMENEZ, et al., 2019) is: 3.6%    Values used to calculate the score:      Age: 53 years      Sex: Female      Is Non- : No      Diabetic: No      Tobacco smoker: No      Systolic Blood Pressure: 163 mmHg      Is BP treated: Yes      HDL Cholesterol: 68 mg/dL      Total Cholesterol: 204 mg/dL       Reviewed and updated as needed this visit by Provider                    BP Readings from Last 3 Encounters:   10/24/24 (!) 144/90   10/23/24 (!) 163/91   23 120/64    Wt Readings from Last 3 Encounters:   10/24/24 70.3 kg (155 lb)   23 66.7 kg (147 lb)   23 64.9 kg (143 lb)                  Patient Active Problem List   Diagnosis    Essential hypertension    S/P laparoscopic assisted vaginal hysterectomy (LAVH)    Astigmatism    Primary osteoarthritis of first carpometacarpal joint of left hand     Past Surgical History:   Procedure Laterality Date    BIOPSY BREAST Left     benign needle bx    C THORACENTESIS,INSRT CHEST TUBE,PTX      COLONOSCOPY N/A 2019    Procedure: Colonoscopy;  Surgeon: Wellington Humphrey MD;  Location:  GI    CYSTOSCOPY  10/14/2013    Procedure: CYSTOSCOPY;;  Surgeon: Elizabeth Arnold MD;  Location: [facility] OR    HYSTERECTOMY, PAP NO LONGER INDICATED      LAPAROSCOPIC ASSISTED HYSTERECTOMY VAGINAL  10/14/2013    Procedure: LAPAROSCOPIC ASSISTED HYSTERECTOMY VAGINAL;  Laparoscopic Assisted VAginal Hysterectomy, cystoscopy.;  Surgeon: Elizabeth Arnold MD;  Location:  OR    TONSILLECTOMY  2007    Z ECHO HEART XTHORACIC,COMPLETE, W/O DOPPLER  2004    stress echo, normal       Social History     Tobacco Use    Smoking status: Former     Current packs/day: 0.00     Types: Cigarettes     Quit date: 2012     Years since quittin.6     "Smokeless tobacco: Never   Substance Use Topics    Alcohol use: Yes     Comment: weekends     Family History   Problem Relation Age of Onset    Hypertension Father     Hypertension Mother     Heart Disease Mother     Anesthesia Reaction Mother         swelling    Diabetes Maternal Grandfather     Cerebrovascular Disease Maternal Aunt         4 strokes, 1st at age 36         Current Outpatient Medications   Medication Sig Dispense Refill    diltiazem ER COATED BEADS (CARDIZEM CD/CARTIA XT) 240 MG 24 hr capsule Take 1 capsule (240 mg) by mouth daily. 90 capsule 4    hydrOXYzine HCl (ATARAX) 25 MG tablet Take 1 tablet (25 mg) by mouth 3 times daily as needed for anxiety. 90 tablet 4    lisinopril-hydrochlorothiazide (ZESTORETIC) 10-12.5 MG tablet Take 2 tablets by mouth daily. 180 tablet 4    metoprolol succinate ER (TOPROL XL) 100 MG 24 hr tablet Take 1 tablet (100 mg) by mouth daily. 90 tablet 4         Review of Systems  Constitutional, HEENT, cardiovascular, pulmonary, GI, , musculoskeletal, neuro, skin, endocrine and psych systems are negative, except as otherwise noted.     Objective    Exam  Pulse 96   Resp 18   Wt 70.3 kg (155 lb)   LMP 09/11/2013   SpO2 97%   BMI 25.51 kg/m     Estimated body mass index is 25.51 kg/m  as calculated from the following:    Height as of 11/13/23: 1.66 m (5' 5.35\").    Weight as of this encounter: 70.3 kg (155 lb).    Physical Exam  GENERAL: alert and no distress  EYES: Eyes grossly normal to inspection, PERRL and conjunctivae and sclerae normal  HENT: ear canals and TM's normal, nose and mouth without ulcers or lesions  NECK: no adenopathy, no asymmetry, masses, or scars  RESP: lungs clear to auscultation - no rales, rhonchi or wheezes  CV: regular rate and rhythm, normal S1 S2, no S3 or S4, no murmur, click or rub, no peripheral edema  ABDOMEN: soft, nontender, no hepatosplenomegaly, no masses and bowel sounds normal  MS: no gross musculoskeletal defects noted, no " edema  SKIN: no suspicious lesions or rashes  NEURO: Normal strength and tone, mentation intact and speech normal  PSYCH: mentation appears normal, affect normal/bright      Assessment & Plan     Routine general medical examination at a health care facility  Vaccinations reviewed and declined at this time.    Essential hypertension  Labs updated today - will have patient increase her Lisinopril-hydrochlorothiazide dose. Nurse only BP check in 2 weeks. Reviewed red flag symptoms that should prompt immediate care in the ER.   - lisinopril-hydrochlorothiazide (ZESTORETIC) 10-12.5 MG tablet; Take 2 tablets by mouth daily.  - metoprolol succinate ER (TOPROL XL) 100 MG 24 hr tablet; Take 1 tablet (100 mg) by mouth daily.  - CBC with platelets; Future  - diltiazem ER COATED BEADS (CARDIZEM CD/CARTIA XT) 240 MG 24 hr capsule; Take 1 capsule (240 mg) by mouth daily.  - Comprehensive metabolic panel (BMP + Alb, Alk Phos, ALT, AST, Total. Bili, TP); Future  - CBC with platelets  - Comprehensive metabolic panel (BMP + Alb, Alk Phos, ALT, AST, Total. Bili, TP)    Screening for thyroid disorder  - TSH with free T4 reflex; Future  - TSH with free T4 reflex    Screening for lipoid disorders  - Lipid panel reflex to direct LDL Fasting; Future  - Lipid panel reflex to direct LDL Fasting    Situational anxiety  - hydrOXYzine HCl (ATARAX) 25 MG tablet; Take 1 tablet (25 mg) by mouth 3 times daily as needed for anxiety.    Atypical chest pain  Suspect anxiety related but will rule out trop for peace of mind for patient.   - Troponin T, High Sensitivity; Future  - Troponin T, High Sensitivity    Patient has been advised of split billing requirements and indicates understanding: Yes      Counseling  Appropriate preventive services were addressed with this patient via screening, questionnaire, or discussion as appropriate for fall prevention, nutrition, physical activity, Tobacco-use cessation, social engagement, weight loss and cognition.   Checklist reviewing preventive services available has been given to the patient.  Reviewed patient's diet, addressing concerns and/or questions.   She is at risk for lack of exercise and has been provided with information to increase physical activity for the benefit of her well-being.   Patient is at risk for social isolation and has been provided with information about the benefit of social connection.   The patient was instructed to see the dentist every 6 months.       Signed Electronically by: Vanesa Thompson PA-C

## 2024-10-24 NOTE — PATIENT INSTRUCTIONS
Patient Education   Preventive Care Advice   This is general advice given by our system to help you stay healthy. However, your care team may have specific advice just for you. Please talk to your care team about your preventive care needs.  Nutrition  Eat 5 or more servings of fruits and vegetables each day.  Try wheat bread, brown rice and whole grain pasta (instead of white bread, rice, and pasta).  Get enough calcium and vitamin D. Check the label on foods and aim for 100% of the RDA (recommended daily allowance).  Lifestyle  Exercise at least 150 minutes each week  (30 minutes a day, 5 days a week).  Do muscle strengthening activities 2 days a week. These help control your weight and prevent disease.  No smoking.  Wear sunscreen to prevent skin cancer.  Have a dental exam and cleaning every 6 months.  Yearly exams  See your health care team every year to talk about:  Any changes in your health.  Any medicines your care team has prescribed.  Preventive care, family planning, and ways to prevent chronic diseases.  Shots (vaccines)   HPV shots (up to age 26), if you've never had them before.  Hepatitis B shots (up to age 59), if you've never had them before.  COVID-19 shot: Get this shot when it's due.  Flu shot: Get a flu shot every year.  Tetanus shot: Get a tetanus shot every 10 years.  Pneumococcal, hepatitis A, and RSV shots: Ask your care team if you need these based on your risk.  Shingles shot (for age 50 and up)  General health tests  Diabetes screening:  Starting at age 35, Get screened for diabetes at least every 3 years.  If you are younger than age 35, ask your care team if you should be screened for diabetes.  Cholesterol test: At age 39, start having a cholesterol test every 5 years, or more often if advised.  Bone density scan (DEXA): At age 50, ask your care team if you should have this scan for osteoporosis (brittle bones).  Hepatitis C: Get tested at least once in your life.  STIs (sexually  transmitted infections)  Before age 24: Ask your care team if you should be screened for STIs.  After age 24: Get screened for STIs if you're at risk. You are at risk for STIs (including HIV) if:  You are sexually active with more than one person.  You don't use condoms every time.  You or a partner was diagnosed with a sexually transmitted infection.  If you are at risk for HIV, ask about PrEP medicine to prevent HIV.  Get tested for HIV at least once in your life, whether you are at risk for HIV or not.  Cancer screening tests  Cervical cancer screening: If you have a cervix, begin getting regular cervical cancer screening tests starting at age 21.  Breast cancer scan (mammogram): If you've ever had breasts, begin having regular mammograms starting at age 40. This is a scan to check for breast cancer.  Colon cancer screening: It is important to start screening for colon cancer at age 45.  Have a colonoscopy test every 10 years (or more often if you're at risk) Or, ask your provider about stool tests like a FIT test every year or Cologuard test every 3 years.  To learn more about your testing options, visit:   .  For help making a decision, visit:   https://bit.ly/oz60214.  Prostate cancer screening test: If you have a prostate, ask your care team if a prostate cancer screening test (PSA) at age 55 is right for you.  Lung cancer screening: If you are a current or former smoker ages 50 to 80, ask your care team if ongoing lung cancer screenings are right for you.  For informational purposes only. Not to replace the advice of your health care provider. Copyright   2023 [facility]. All rights reserved. Clinically reviewed by the [facility] Transitions Program. Bebo [id number] - REV 01/24.  Learning About Stress  What is stress?     Stress is your body's response to a hard situation. Your body can have a physical, emotional, or mental response. Stress is a fact of life for most people, and it  affects everyone differently. What causes stress for you may not be stressful for someone else.  A lot of things can cause stress. You may feel stress when you go on a job interview, take a test, or run a race. This kind of short-term stress is normal and even useful. It can help you if you need to work hard or react quickly. For example, stress can help you finish an important job on time.  Long-term stress is caused by ongoing stressful situations or events. Examples of long-term stress include long-term health problems, ongoing problems at work, or conflicts in your family. Long-term stress can harm your health.  How does stress affect your health?  When you are stressed, your body responds as though you are in danger. It makes hormones that speed up your heart, make you breathe faster, and give you a burst of energy. This is called the fight-or-flight stress response. If the stress is over quickly, your body goes back to normal and no harm is done.  But if stress happens too often or lasts too long, it can have bad effects. Long-term stress can make you more likely to get sick, and it can make symptoms of some diseases worse. If you tense up when you are stressed, you may develop neck, shoulder, or low back pain. Stress is linked to high blood pressure and heart disease.  Stress also harms your emotional health. It can make you cleaning, tense, or depressed. Your relationships may suffer, and you may not do well at work or school.  What can you do to manage stress?  You can try these things to help manage stress:   Do something active. Exercise or activity can help reduce stress. Walking is a great way to get started. Even everyday activities such as housecleaning or yard work can help.  Try yoga or garfield chi. These techniques combine exercise and meditation. You may need some training at first to learn them.  Do something you enjoy. For example, listen to music or go to a movie. Practice your hobby or do volunteer  "work.  Meditate. This can help you relax, because you are not worrying about what happened before or what may happen in the future.  Do guided imagery. Imagine yourself in any setting that helps you feel calm. You can use online videos, books, or a teacher to guide you.  Do breathing exercises. For example:  From a standing position, bend forward from the waist with your knees slightly bent. Let your arms dangle close to the floor.  Breathe in slowly and deeply as you return to a standing position. Roll up slowly and lift your head last.  Hold your breath for just a few seconds in the standing position.  Breathe out slowly and bend forward from the waist.  Let your feelings out. Talk, laugh, cry, and express anger when you need to. Talking with supportive friends or family, a counselor, or a carolyn leader about your feelings is a healthy way to relieve stress. Avoid discussing your feelings with people who make you feel worse.  Write. It may help to write about things that are bothering you. This helps you find out how much stress you feel and what is causing it. When you know this, you can find better ways to cope.  What can you do to prevent stress?  You might try some of these things to help prevent stress:  Manage your time. This helps you find time to do the things you want and need to do.  Get enough sleep. Your body recovers from the stresses of the day while you are sleeping.  Get support. Your family, friends, and community can make a difference in how you experience stress.  Limit your news feed. Avoid or limit time on social media or news that may make you feel stressed.  Do something active. Exercise or activity can help reduce stress. Walking is a great way to get started.  Where can you learn more?  Go to https://www.[employer].net/patiented  Enter N032 in the search box to learn more about \"Learning About Stress.\"  Current as of: October 24, 2023  Content Version: 14.2 2024 [employer]. "   Care instructions adapted under license by your healthcare professional. If you have questions about a medical condition or this instruction, always ask your healthcare professional. [employer] disclaims any warranty or liability for your use of this information.

## 2024-10-29 ENCOUNTER — APPOINTMENT (OUTPATIENT)
Dept: LAB | Facility: OTHER | Age: 54
End: 2024-10-29
Attending: PHYSICIAN ASSISTANT
Payer: COMMERCIAL

## 2024-11-11 ENCOUNTER — ALLIED HEALTH/NURSE VISIT (OUTPATIENT)
Dept: FAMILY MEDICINE | Facility: OTHER | Age: 54
End: 2024-11-11
Payer: COMMERCIAL

## 2024-11-11 ENCOUNTER — TELEPHONE (OUTPATIENT)
Dept: FAMILY MEDICINE | Facility: CLINIC | Age: 54
End: 2024-11-11

## 2024-11-11 VITALS — DIASTOLIC BLOOD PRESSURE: 84 MMHG | SYSTOLIC BLOOD PRESSURE: 128 MMHG

## 2024-11-11 DIAGNOSIS — Z01.30 BP CHECK: Primary | ICD-10-CM

## 2024-11-11 PROCEDURE — 99207 PR NO CHARGE NURSE ONLY: CPT

## 2024-11-11 NOTE — PROGRESS NOTES
Lisa Urbina is a 53 year old patient who comes in today for a Blood Pressure check with a Medical Assistant because of medication change.  Initial BP:  LMP 09/11/2013      Blood pressure is not high.   Repeat Blood pressure: No  Patient is taking medication as prescribed.  Patient is tolerating medications well.  Is patient taking blood pressures at home? No  Current Symptoms: none    Disposition:   Abnormal Blood Pressure NO

## 2024-11-11 NOTE — TELEPHONE ENCOUNTER
Please have patient continue her current regimen as blood pressure looks great today.     Vanesa Thompson PA-C

## 2025-01-27 ENCOUNTER — HOSPITAL ENCOUNTER (EMERGENCY)
Facility: CLINIC | Age: 55
Discharge: HOME | End: 2025-01-27
Attending: STUDENT IN AN ORGANIZED HEALTH CARE EDUCATION/TRAINING PROGRAM | Admitting: STUDENT IN AN ORGANIZED HEALTH CARE EDUCATION/TRAINING PROGRAM
Payer: COMMERCIAL

## 2025-01-27 ENCOUNTER — ALLIED HEALTH/NURSE VISIT (OUTPATIENT)
Dept: FAMILY MEDICINE | Facility: OTHER | Age: 55
End: 2025-01-27
Payer: COMMERCIAL

## 2025-01-27 ENCOUNTER — NURSE TRIAGE (OUTPATIENT)
Dept: FAMILY MEDICINE | Facility: CLINIC | Age: 55
End: 2025-01-27

## 2025-01-27 VITALS
BODY MASS INDEX: 26.41 KG/M2 | OXYGEN SATURATION: 97 % | DIASTOLIC BLOOD PRESSURE: 86 MMHG | HEART RATE: 82 BPM | HEIGHT: 65 IN | SYSTOLIC BLOOD PRESSURE: 132 MMHG | RESPIRATION RATE: 18 BRPM | WEIGHT: 158.5 LBS | TEMPERATURE: 98.9 F

## 2025-01-27 DIAGNOSIS — Z71.9 COUNSELED BY NURSE: Primary | ICD-10-CM

## 2025-01-27 DIAGNOSIS — R22.0 MILD TONGUE SWELLING: ICD-10-CM

## 2025-01-27 LAB
ALBUMIN UR-MCNC: NEGATIVE MG/DL
ANION GAP SERPL CALCULATED.3IONS-SCNC: 15 MMOL/L (ref 7–15)
APPEARANCE UR: CLEAR
BACTERIA #/AREA URNS HPF: ABNORMAL /HPF
BASOPHILS # BLD AUTO: 0.1 10E3/UL (ref 0–0.2)
BASOPHILS NFR BLD AUTO: 0 %
BILIRUB UR QL STRIP: NEGATIVE
BUN SERPL-MCNC: 13.1 MG/DL (ref 6–20)
CALCIUM SERPL-MCNC: 9.3 MG/DL (ref 8.8–10.4)
CHLORIDE SERPL-SCNC: 101 MMOL/L (ref 98–107)
COLOR UR AUTO: ABNORMAL
CREAT SERPL-MCNC: 0.75 MG/DL (ref 0.51–0.95)
EGFRCR SERPLBLD CKD-EPI 2021: >90 ML/MIN/1.73M2
EOSINOPHIL # BLD AUTO: 0.1 10E3/UL (ref 0–0.7)
EOSINOPHIL NFR BLD AUTO: 1 %
ERYTHROCYTE [DISTWIDTH] IN BLOOD BY AUTOMATED COUNT: 12.5 % (ref 10–15)
GLUCOSE SERPL-MCNC: 86 MG/DL (ref 70–99)
GLUCOSE UR STRIP-MCNC: NEGATIVE MG/DL
HCO3 SERPL-SCNC: 24 MMOL/L (ref 22–29)
HCT VFR BLD AUTO: 35.5 % (ref 35–47)
HGB BLD-MCNC: 12.2 G/DL (ref 11.7–15.7)
HGB UR QL STRIP: NEGATIVE
IMM GRANULOCYTES # BLD: 0.1 10E3/UL
IMM GRANULOCYTES NFR BLD: 0 %
KETONES UR STRIP-MCNC: NEGATIVE MG/DL
LEUKOCYTE ESTERASE UR QL STRIP: NEGATIVE
LYMPHOCYTES # BLD AUTO: 3.1 10E3/UL (ref 0.8–5.3)
LYMPHOCYTES NFR BLD AUTO: 23 %
MCH RBC QN AUTO: 31.2 PG (ref 26.5–33)
MCHC RBC AUTO-ENTMCNC: 34.4 G/DL (ref 31.5–36.5)
MCV RBC AUTO: 91 FL (ref 78–100)
MONOCYTES # BLD AUTO: 1 10E3/UL (ref 0–1.3)
MONOCYTES NFR BLD AUTO: 7 %
NEUTROPHILS # BLD AUTO: 9.6 10E3/UL (ref 1.6–8.3)
NEUTROPHILS NFR BLD AUTO: 69 %
NITRATE UR QL: NEGATIVE
NRBC # BLD AUTO: 0 10E3/UL
NRBC BLD AUTO-RTO: 0 /100
PH UR STRIP: 7 [PH] (ref 5–7)
PLATELET # BLD AUTO: 342 10E3/UL (ref 150–450)
POTASSIUM SERPL-SCNC: 3.7 MMOL/L (ref 3.4–5.3)
RBC # BLD AUTO: 3.91 10E6/UL (ref 3.8–5.2)
RBC URINE: 3 /HPF
SODIUM SERPL-SCNC: 140 MMOL/L (ref 135–145)
SP GR UR STRIP: 1.02 (ref 1–1.03)
SQUAMOUS EPITHELIAL: 1 /HPF
TRANSITIONAL EPI: 1 /HPF
UROBILINOGEN UR STRIP-MCNC: NORMAL MG/DL
WBC # BLD AUTO: 14 10E3/UL (ref 4–11)
WBC URINE: 7 /HPF

## 2025-01-27 PROCEDURE — 99284 EMERGENCY DEPT VISIT MOD MDM: CPT | Performed by: STUDENT IN AN ORGANIZED HEALTH CARE EDUCATION/TRAINING PROGRAM

## 2025-01-27 PROCEDURE — 96375 TX/PRO/DX INJ NEW DRUG ADDON: CPT | Performed by: STUDENT IN AN ORGANIZED HEALTH CARE EDUCATION/TRAINING PROGRAM

## 2025-01-27 PROCEDURE — 96374 THER/PROPH/DIAG INJ IV PUSH: CPT | Performed by: STUDENT IN AN ORGANIZED HEALTH CARE EDUCATION/TRAINING PROGRAM

## 2025-01-27 PROCEDURE — 85025 COMPLETE CBC W/AUTO DIFF WBC: CPT | Performed by: STUDENT IN AN ORGANIZED HEALTH CARE EDUCATION/TRAINING PROGRAM

## 2025-01-27 PROCEDURE — 81003 URINALYSIS AUTO W/O SCOPE: CPT | Performed by: STUDENT IN AN ORGANIZED HEALTH CARE EDUCATION/TRAINING PROGRAM

## 2025-01-27 PROCEDURE — 80048 BASIC METABOLIC PNL TOTAL CA: CPT | Performed by: STUDENT IN AN ORGANIZED HEALTH CARE EDUCATION/TRAINING PROGRAM

## 2025-01-27 PROCEDURE — 36415 COLL VENOUS BLD VENIPUNCTURE: CPT | Performed by: STUDENT IN AN ORGANIZED HEALTH CARE EDUCATION/TRAINING PROGRAM

## 2025-01-27 PROCEDURE — 99284 EMERGENCY DEPT VISIT MOD MDM: CPT | Mod: 25 | Performed by: STUDENT IN AN ORGANIZED HEALTH CARE EDUCATION/TRAINING PROGRAM

## 2025-01-27 PROCEDURE — 82565 ASSAY OF CREATININE: CPT | Performed by: STUDENT IN AN ORGANIZED HEALTH CARE EDUCATION/TRAINING PROGRAM

## 2025-01-27 PROCEDURE — 99207 PR NO CHARGE NURSE ONLY: CPT

## 2025-01-27 PROCEDURE — 82310 ASSAY OF CALCIUM: CPT | Performed by: STUDENT IN AN ORGANIZED HEALTH CARE EDUCATION/TRAINING PROGRAM

## 2025-01-27 PROCEDURE — 250N000013 HC RX MED GY IP 250 OP 250 PS 637: Performed by: STUDENT IN AN ORGANIZED HEALTH CARE EDUCATION/TRAINING PROGRAM

## 2025-01-27 PROCEDURE — 250N000011 HC RX IP 250 OP 636: Performed by: STUDENT IN AN ORGANIZED HEALTH CARE EDUCATION/TRAINING PROGRAM

## 2025-01-27 RX ORDER — CETIRIZINE HYDROCHLORIDE 10 MG/1
10 TABLET ORAL DAILY
Qty: 30 TABLET | Refills: 0 | Status: SHIPPED | OUTPATIENT
Start: 2025-01-27

## 2025-01-27 RX ORDER — CETIRIZINE HYDROCHLORIDE 10 MG/1
10 TABLET ORAL ONCE
Status: COMPLETED | OUTPATIENT
Start: 2025-01-27 | End: 2025-01-27

## 2025-01-27 RX ORDER — CEPHALEXIN 500 MG/1
500 CAPSULE ORAL 3 TIMES DAILY
Qty: 21 CAPSULE | Refills: 0 | Status: ON HOLD | OUTPATIENT
Start: 2025-01-27 | End: 2025-01-29

## 2025-01-27 RX ORDER — DEXAMETHASONE SODIUM PHOSPHATE 10 MG/ML
10 INJECTION, SOLUTION INTRAMUSCULAR; INTRAVENOUS ONCE
Status: COMPLETED | OUTPATIENT
Start: 2025-01-27 | End: 2025-01-27

## 2025-01-27 RX ORDER — PREDNISONE 20 MG/1
TABLET ORAL
Qty: 10 TABLET | Refills: 0 | Status: SHIPPED | OUTPATIENT
Start: 2025-01-27

## 2025-01-27 RX ORDER — KETOROLAC TROMETHAMINE 30 MG/ML
30 INJECTION, SOLUTION INTRAMUSCULAR; INTRAVENOUS ONCE
Status: COMPLETED | OUTPATIENT
Start: 2025-01-27 | End: 2025-01-27

## 2025-01-27 RX ADMIN — KETOROLAC TROMETHAMINE 30 MG: 30 INJECTION, SOLUTION INTRAMUSCULAR at 17:37

## 2025-01-27 RX ADMIN — CETIRIZINE HYDROCHLORIDE 10 MG: 10 TABLET, FILM COATED ORAL at 17:14

## 2025-01-27 RX ADMIN — DEXAMETHASONE SODIUM PHOSPHATE 10 MG: 10 INJECTION, SOLUTION INTRAMUSCULAR; INTRAVENOUS at 17:33

## 2025-01-27 ASSESSMENT — ACTIVITIES OF DAILY LIVING (ADL)
ADLS_ACUITY_SCORE: 41
ADLS_ACUITY_SCORE: 41

## 2025-01-27 ASSESSMENT — COLUMBIA-SUICIDE SEVERITY RATING SCALE - C-SSRS
1. IN THE PAST MONTH, HAVE YOU WISHED YOU WERE DEAD OR WISHED YOU COULD GO TO SLEEP AND NOT WAKE UP?: NO
6. HAVE YOU EVER DONE ANYTHING, STARTED TO DO ANYTHING, OR PREPARED TO DO ANYTHING TO END YOUR LIFE?: NO
2. HAVE YOU ACTUALLY HAD ANY THOUGHTS OF KILLING YOURSELF IN THE PAST MONTH?: NO
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
## 2025-01-27 NOTE — TELEPHONE ENCOUNTER
Nurse Triage SBAR    Is this a 2nd Level Triage? 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 Left side of tongue noted to be swollen. Patient states she has noticed it getting worse over the last hour. She is now talking with a noticeable slur or lisp. Patient denies difficulty swallowing or breathing.  Patient states she did eat a peanut butter and jelly sandwich and salt/vinegar chips for lunch. States she has had all of the same foods before and not had an issue.  Denies biting her tongue or feeling any issues with the chips.      Background: tongue swelling for last hour.     Assessment: Advised that per protocol to be seen in ED.     Protocol Recommended Disposition:   Go to ED Now    Recommendation: patient verbalized understanding, really does not want to go.  RN did huddle with provider, Kristie Edouard NP, regarding situation. She did come and examine patient.  She advised patient to be seen in ED Now.  Likely having some type of allergic reaction and will need medications.  She advised patient go to ED, have someone drive her if possible.  Patient verbalized understanding and is agreeable. She will make a call.  She will go now.       RN huddled with provider.     Does the patient meet one of the following criteria for ADS visit consideration? 16+ years old, with an [facility] PCP     TIP  Providers, please consider if this condition is appropriate for management at one of our Acute and Diagnostic Services sites.     If patient is a good candidate, please use dotphrase <dot>triageresponse and select Refer to ADS to document.      Reason for Disposition   All other adults with tongue swelling  (Exception: Tongue swelling is a recurrent problem AND NO swelling at present.)    Additional Information   Negative: Started suddenly after sting from bee, wasp, or yellow jacket   Negative: Started suddenly after taking a medicine or allergic food (e.g., nuts)    Negative: Wheezing, stridor, hoarseness, or difficulty breathing   Negative: Facial swelling   Negative: Neck swelling   Negative: Can't swallow normal secretions (e.g., drooling or spitting)   Negative: Taking an ACE Inhibitor medicine (e.g., benazepril / LOTENSIN, captopril / CAPOTEN, enalapril / VASOTEC, lisinopril / ZESTRIL)   Negative: Sounds like a life-threatening emergency to the triager   Negative: Followed a tongue injury   Negative: Pain in tongue, mouth, or tooth    Protocols used: Tongue Swelling-A-AH

## 2025-01-27 NOTE — ED TRIAGE NOTES
PT presents with c/o tongue swelling that started a couple HRS prior to ED visit. Swelling noted to left side of the tongue on triage. NKDA and NKFA. PT reports that she had peanut butter and jelly for lunch before symptoms started. Denies SOB or throat issues.

## 2025-01-27 NOTE — Clinical Note
Lisa Urbina was seen and treated in our emergency department on 1/27/2025.  She may return to work on 01/29/2025.       If you have any questions or concerns, please don't hesitate to call.      Matheus Shepard MD

## 2025-01-28 ENCOUNTER — APPOINTMENT (OUTPATIENT)
Dept: CT IMAGING | Facility: CLINIC | Age: 55
End: 2025-01-28
Attending: FAMILY MEDICINE
Payer: COMMERCIAL

## 2025-01-28 ENCOUNTER — HOSPITAL ENCOUNTER (OUTPATIENT)
Facility: CLINIC | Age: 55
Setting detail: OBSERVATION
Discharge: HOME | End: 2025-01-29
Attending: FAMILY MEDICINE | Admitting: PEDIATRICS
Payer: COMMERCIAL

## 2025-01-28 DIAGNOSIS — R22.1 NECK SWELLING: ICD-10-CM

## 2025-01-28 DIAGNOSIS — J02.9 SORE THROAT: Primary | ICD-10-CM

## 2025-01-28 DIAGNOSIS — T78.3XXA ANGIOEDEMA, INITIAL ENCOUNTER: ICD-10-CM

## 2025-01-28 PROBLEM — D72.828 NEUTROPHILIC LEUKOCYTOSIS: Status: ACTIVE | Noted: 2025-01-28

## 2025-01-28 LAB
ABO + RH BLD: NORMAL
ANION GAP SERPL CALCULATED.3IONS-SCNC: 13 MMOL/L (ref 7–15)
BASOPHILS # BLD AUTO: 0 10E3/UL (ref 0–0.2)
BASOPHILS NFR BLD AUTO: 0 %
BLD GP AB SCN SERPL QL: NEGATIVE
BLD PROD TYP BPU: NORMAL
BLD PROD TYP BPU: NORMAL
BLOOD COMPONENT TYPE: NORMAL
BLOOD COMPONENT TYPE: NORMAL
BUN SERPL-MCNC: 14.8 MG/DL (ref 6–20)
CALCIUM SERPL-MCNC: 8.8 MG/DL (ref 8.8–10.4)
CHLORIDE SERPL-SCNC: 102 MMOL/L (ref 98–107)
CODING SYSTEM: NORMAL
CODING SYSTEM: NORMAL
CREAT SERPL-MCNC: 0.68 MG/DL (ref 0.51–0.95)
CRP SERPL-MCNC: <3 MG/L
EGFRCR SERPLBLD CKD-EPI 2021: >90 ML/MIN/1.73M2
EOSINOPHIL # BLD AUTO: 0 10E3/UL (ref 0–0.7)
EOSINOPHIL NFR BLD AUTO: 0 %
ERYTHROCYTE [DISTWIDTH] IN BLOOD BY AUTOMATED COUNT: 12.1 % (ref 10–15)
GLUCOSE SERPL-MCNC: 134 MG/DL (ref 70–99)
HCO3 SERPL-SCNC: 21 MMOL/L (ref 22–29)
HCT VFR BLD AUTO: 32.8 % (ref 35–47)
HGB BLD-MCNC: 11.5 G/DL (ref 11.7–15.7)
IMM GRANULOCYTES # BLD: 0.1 10E3/UL
IMM GRANULOCYTES NFR BLD: 1 %
ISSUE DATE AND TIME: NORMAL
ISSUE DATE AND TIME: NORMAL
LYMPHOCYTES # BLD AUTO: 1.1 10E3/UL (ref 0.8–5.3)
LYMPHOCYTES NFR BLD AUTO: 9 %
MCH RBC QN AUTO: 31.3 PG (ref 26.5–33)
MCHC RBC AUTO-ENTMCNC: 35.1 G/DL (ref 31.5–36.5)
MCV RBC AUTO: 89 FL (ref 78–100)
MONOCYTES # BLD AUTO: 0.2 10E3/UL (ref 0–1.3)
MONOCYTES NFR BLD AUTO: 1 %
NEUTROPHILS # BLD AUTO: 11.3 10E3/UL (ref 1.6–8.3)
NEUTROPHILS NFR BLD AUTO: 89 %
NRBC # BLD AUTO: 0 10E3/UL
NRBC BLD AUTO-RTO: 0 /100
PLATELET # BLD AUTO: 337 10E3/UL (ref 150–450)
POTASSIUM SERPL-SCNC: 3.9 MMOL/L (ref 3.4–5.3)
RBC # BLD AUTO: 3.68 10E6/UL (ref 3.8–5.2)
SODIUM SERPL-SCNC: 136 MMOL/L (ref 135–145)
SPECIMEN EXP DATE BLD: NORMAL
UNIT ABO/RH: NORMAL
UNIT ABO/RH: NORMAL
UNIT NUMBER: NORMAL
UNIT NUMBER: NORMAL
UNIT STATUS: NORMAL
UNIT STATUS: NORMAL
UNIT TYPE ISBT: 6200
UNIT TYPE ISBT: 6200
WBC # BLD AUTO: 12.7 10E3/UL (ref 4–11)

## 2025-01-28 PROCEDURE — 36430 TRANSFUSION BLD/BLD COMPNT: CPT

## 2025-01-28 PROCEDURE — 85025 COMPLETE CBC W/AUTO DIFF WBC: CPT | Performed by: FAMILY MEDICINE

## 2025-01-28 PROCEDURE — 36415 COLL VENOUS BLD VENIPUNCTURE: CPT | Performed by: FAMILY MEDICINE

## 2025-01-28 PROCEDURE — 86850 RBC ANTIBODY SCREEN: CPT | Performed by: STUDENT IN AN ORGANIZED HEALTH CARE EDUCATION/TRAINING PROGRAM

## 2025-01-28 PROCEDURE — G0378 HOSPITAL OBSERVATION PER HR: HCPCS

## 2025-01-28 PROCEDURE — 86160 COMPLEMENT ANTIGEN: CPT | Performed by: FAMILY MEDICINE

## 2025-01-28 PROCEDURE — 250N000011 HC RX IP 250 OP 636: Performed by: STUDENT IN AN ORGANIZED HEALTH CARE EDUCATION/TRAINING PROGRAM

## 2025-01-28 PROCEDURE — 96375 TX/PRO/DX INJ NEW DRUG ADDON: CPT | Mod: 59

## 2025-01-28 PROCEDURE — 70491 CT SOFT TISSUE NECK W/DYE: CPT

## 2025-01-28 PROCEDURE — 250N000011 HC RX IP 250 OP 636: Performed by: FAMILY MEDICINE

## 2025-01-28 PROCEDURE — 85048 AUTOMATED LEUKOCYTE COUNT: CPT | Performed by: FAMILY MEDICINE

## 2025-01-28 PROCEDURE — 87040 BLOOD CULTURE FOR BACTERIA: CPT | Performed by: STUDENT IN AN ORGANIZED HEALTH CARE EDUCATION/TRAINING PROGRAM

## 2025-01-28 PROCEDURE — 250N000013 HC RX MED GY IP 250 OP 250 PS 637: Performed by: HOSPITALIST

## 2025-01-28 PROCEDURE — 99291 CRITICAL CARE FIRST HOUR: CPT | Mod: 25 | Performed by: FAMILY MEDICINE

## 2025-01-28 PROCEDURE — 250N000009 HC RX 250: Performed by: FAMILY MEDICINE

## 2025-01-28 PROCEDURE — 80048 BASIC METABOLIC PNL TOTAL CA: CPT | Performed by: FAMILY MEDICINE

## 2025-01-28 PROCEDURE — 96365 THER/PROPH/DIAG IV INF INIT: CPT | Mod: 59

## 2025-01-28 PROCEDURE — 99285 EMERGENCY DEPT VISIT HI MDM: CPT | Mod: 25

## 2025-01-28 PROCEDURE — P9059 PLASMA, FRZ BETWEEN 8-24HOUR: HCPCS | Performed by: STUDENT IN AN ORGANIZED HEALTH CARE EDUCATION/TRAINING PROGRAM

## 2025-01-28 PROCEDURE — 99292 CRITICAL CARE ADDL 30 MIN: CPT | Performed by: FAMILY MEDICINE

## 2025-01-28 PROCEDURE — 36415 COLL VENOUS BLD VENIPUNCTURE: CPT | Performed by: STUDENT IN AN ORGANIZED HEALTH CARE EDUCATION/TRAINING PROGRAM

## 2025-01-28 PROCEDURE — 86140 C-REACTIVE PROTEIN: CPT | Performed by: FAMILY MEDICINE

## 2025-01-28 PROCEDURE — 86900 BLOOD TYPING SEROLOGIC ABO: CPT | Performed by: STUDENT IN AN ORGANIZED HEALTH CARE EDUCATION/TRAINING PROGRAM

## 2025-01-28 PROCEDURE — 99222 1ST HOSP IP/OBS MODERATE 55: CPT | Mod: AI | Performed by: PEDIATRICS

## 2025-01-28 RX ORDER — AMPICILLIN AND SULBACTAM 2; 1 G/1; G/1
3 INJECTION, POWDER, FOR SOLUTION INTRAMUSCULAR; INTRAVENOUS ONCE
Status: COMPLETED | OUTPATIENT
Start: 2025-01-28 | End: 2025-01-28

## 2025-01-28 RX ORDER — LIDOCAINE 40 MG/G
CREAM TOPICAL
Status: DISCONTINUED | OUTPATIENT
Start: 2025-01-28 | End: 2025-01-29 | Stop reason: HOSPADM

## 2025-01-28 RX ORDER — ONDANSETRON 4 MG/1
4 TABLET, ORALLY DISINTEGRATING ORAL EVERY 6 HOURS PRN
Status: DISCONTINUED | OUTPATIENT
Start: 2025-01-28 | End: 2025-01-29 | Stop reason: HOSPADM

## 2025-01-28 RX ORDER — PROCHLORPERAZINE MALEATE 5 MG/1
10 TABLET ORAL EVERY 6 HOURS PRN
Status: DISCONTINUED | OUTPATIENT
Start: 2025-01-28 | End: 2025-01-29 | Stop reason: HOSPADM

## 2025-01-28 RX ORDER — DILTIAZEM HYDROCHLORIDE 120 MG/1
240 CAPSULE, COATED, EXTENDED RELEASE ORAL DAILY
Status: DISCONTINUED | OUTPATIENT
Start: 2025-01-29 | End: 2025-01-29 | Stop reason: HOSPADM

## 2025-01-28 RX ORDER — CETIRIZINE HYDROCHLORIDE 10 MG/1
10 TABLET ORAL DAILY
Status: DISCONTINUED | OUTPATIENT
Start: 2025-01-29 | End: 2025-01-29 | Stop reason: HOSPADM

## 2025-01-28 RX ORDER — ACETAMINOPHEN 325 MG/1
650 TABLET ORAL EVERY 4 HOURS PRN
Status: DISCONTINUED | OUTPATIENT
Start: 2025-01-28 | End: 2025-01-29 | Stop reason: HOSPADM

## 2025-01-28 RX ORDER — ONDANSETRON 2 MG/ML
4 INJECTION INTRAMUSCULAR; INTRAVENOUS EVERY 6 HOURS PRN
Status: DISCONTINUED | OUTPATIENT
Start: 2025-01-28 | End: 2025-01-29 | Stop reason: HOSPADM

## 2025-01-28 RX ORDER — DEXAMETHASONE SODIUM PHOSPHATE 10 MG/ML
10 INJECTION, SOLUTION INTRAMUSCULAR; INTRAVENOUS ONCE
Status: COMPLETED | OUTPATIENT
Start: 2025-01-28 | End: 2025-01-28

## 2025-01-28 RX ORDER — IOPAMIDOL 755 MG/ML
500 INJECTION, SOLUTION INTRAVASCULAR ONCE
Status: COMPLETED | OUTPATIENT
Start: 2025-01-28 | End: 2025-01-28

## 2025-01-28 RX ADMIN — ACETAMINOPHEN 650 MG: 325 TABLET ORAL at 20:04

## 2025-01-28 RX ADMIN — AMPICILLIN SODIUM AND SULBACTAM SODIUM 3 G: 2; 1 INJECTION, POWDER, FOR SOLUTION INTRAMUSCULAR; INTRAVENOUS at 11:39

## 2025-01-28 RX ADMIN — DEXAMETHASONE SODIUM PHOSPHATE 10 MG: 10 INJECTION, SOLUTION INTRAMUSCULAR; INTRAVENOUS at 07:26

## 2025-01-28 RX ADMIN — IOPAMIDOL 90 ML: 755 INJECTION, SOLUTION INTRAVENOUS at 07:35

## 2025-01-28 RX ADMIN — SODIUM CHLORIDE 70 ML: 9 INJECTION, SOLUTION INTRAVENOUS at 07:35

## 2025-01-28 ASSESSMENT — ACTIVITIES OF DAILY LIVING (ADL)
ADLS_ACUITY_SCORE: 41
ADLS_ACUITY_SCORE: 15
ADLS_ACUITY_SCORE: 15
ADLS_ACUITY_SCORE: 41
ADLS_ACUITY_SCORE: 15
ADLS_ACUITY_SCORE: 41
ADLS_ACUITY_SCORE: 15
ADLS_ACUITY_SCORE: 41
ADLS_ACUITY_SCORE: 41
ADLS_ACUITY_SCORE: 15
ADLS_ACUITY_SCORE: 41
ADLS_ACUITY_SCORE: 15
ADLS_ACUITY_SCORE: 15

## 2025-01-28 ASSESSMENT — COLUMBIA-SUICIDE SEVERITY RATING SCALE - C-SSRS
6. HAVE YOU EVER DONE ANYTHING, STARTED TO DO ANYTHING, OR PREPARED TO DO ANYTHING TO END YOUR LIFE?: NO
1. IN THE PAST MONTH, HAVE YOU WISHED YOU WERE DEAD OR WISHED YOU COULD GO TO SLEEP AND NOT WAKE UP?: NO
2. HAVE YOU ACTUALLY HAD ANY THOUGHTS OF KILLING YOURSELF IN THE PAST MONTH?: NO
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
## 2025-01-28 NOTE — ED NOTES
ED Nursing criteria listed below was addressed during verbal handoff:     Abnormal vitals: No  Abnormal results: Yes  Med Reconciliation completed: Yes  Meds given in ED: Yes  Any Overdue Meds: N/A  Core Measures: N/A  Isolation: N/A  Special needs: N/A  Skin assessment: Yes    Observation Patient  Education provided: N/A
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
## 2025-01-28 NOTE — ED TRIAGE NOTES
Triage Assessment (Adult)       Row Name 01/28/25 7687          Triage Assessment    Airway WDL WDL        Respiratory WDL    Respiratory WDL WDL                   Pt seen yesterday for swelling of her tongue.  Now feels like the tongue swelling has resolved but now feels like her neck is swollen.
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
## 2025-01-28 NOTE — ED PROVIDER NOTES
Emergency Department Patient Sign-out       Brief HPI:      I assumed care of this patient from the previous ER physician.  Reviewed previous documentation and workup.    After introducing myself it was noted that she did have voice changes, the posterior pharynx noted some fullness of the left side, no gross stridor.  She was still tolerating her oral secretions.  Did not appear to have any immediate indication for intubation for airway protection however 2 IVs were placed for secure IV access, she was placed on the cardiac monitor, she was moved to our resuscitation bay for close monitoring and airway equipment if needed.  In the interim her blood work did result with a reassuring CRP, downtrending white blood cell count from 14, now 12.7 today.  Otherwise rest of blood work is reassuring.  CT resulted with marked edema in the left oropharynx, epiglottis, hypopharynx, also inflammation of the left submandibular space and moderate prevertebral effusion.  History and physical again are most suggestive of ACE inhibitor related angioedema.  I discussed this case with our ENT provider that met with the patient at bedside, also with ENT at the [facility], both of their perspectives suggest any angioedema related to ACE inhibitor's rather than acute infectious etiology.  The anesthesia team was able to meet with the patient as well.  Even with this being said we did still place the patient on Unasyn.  Subsequent FFP was ordered and given.  After initial evaluation I did reach out to several hospitals for potential transfer for definitive care, this was done without success, she is on a wait list for several hospitals.  After FFP was initiated the patient did have slow but improvement of her symptoms.  She feels like her voice is improving.  She is able to lean back and close her eyes more comfortably.  After treating her in the ER for roughly 8 hours with no decompensation and slow improvement I did have a  discussion with her hospitalist team here and they have met with the patient at bedside, they are comfortable with keeping her in her ICU for very close monitoring and further care.  During her time in the ER she did not have any episodes of acute decompensation.  We have had an extensive conversation of angioedema and head and neck and airway anatomy.   is at bedside and all questions were answered from his perspective as well.    Critical Care time was 180 minutes for this patient excluding procedures.      Exam:   Patient Vitals for the past 24 hrs:   BP Temp Temp src Pulse Resp SpO2 Weight   01/28/25 1549 (!) (P) 139/93 (P) 98.4  F (36.9  C) (P) Oral (P) 65 (P) 18 (P) 100 % (P) 70.5 kg (155 lb 6.4 oz)   01/28/25 1523 122/78 98.1  F (36.7  C) -- 68 17 -- --   01/28/25 1515 -- -- -- -- -- 96 % --   01/28/25 1505 -- -- -- -- -- 96 % --   01/28/25 1500 -- -- -- 66 -- 96 % --   01/28/25 1449 128/68 -- -- -- -- 97 % --   01/28/25 1434 119/65 -- -- -- -- 97 % --   01/28/25 1419 131/69 -- -- -- -- 97 % --   01/28/25 1401 124/61 -- -- 82 -- 98 % --   01/28/25 1351 131/78 -- -- -- -- 97 % --   01/28/25 1348 131/78 97.8  F (36.6  C) -- 80 18 -- --   01/28/25 1346 132/80 -- -- 78 -- 96 % --   01/28/25 1345 132/80 -- -- 78 -- 96 % --   01/28/25 1336 130/82 -- -- 87 -- 97 % --   01/28/25 1335 130/82 -- -- 87 -- 96 % --   01/28/25 1333 130/82 98.2  F (36.8  C) -- 86 16 -- --   01/28/25 1331 131/74 -- -- 81 -- 97 % --   01/28/25 1316 126/65 -- -- 66 -- 96 % --   01/28/25 1301 124/65 -- -- 71 -- 97 % --   01/28/25 1256 129/83 -- -- -- -- -- --   01/28/25 1251 120/63 -- -- 73 -- 96 % --   01/28/25 1245 116/67 98  F (36.7  C) -- 73 17 96 % --   01/28/25 1231 124/75 -- -- 74 -- 95 % --   01/28/25 1216 (!) 128/102 -- -- 78 -- 97 % --   01/28/25 1201 121/64 -- -- 68 -- 96 % --   01/28/25 1147 124/75 -- -- 65 -- 96 % --   01/28/25 1146 124/75 -- -- 65 -- 97 % --   01/28/25 1132 115/68 -- -- 69 -- 96 % --   01/28/25 1122 -- 98.3   F (36.8  C) -- -- -- -- --   01/28/25 1117 134/74 -- -- 90 -- 97 % --   01/28/25 1113 134/74 -- -- -- -- 97 % --   01/28/25 1100 125/80 -- -- 74 -- 96 % --   01/28/25 1045 128/78 -- -- 76 -- 96 % --   01/28/25 1030 120/67 -- -- 64 -- 97 % --   01/28/25 1021 125/75 98.2  F (36.8  C) -- 72 19 96 % --   01/28/25 1020 125/75 -- -- 72 -- 96 % --   01/28/25 1014 124/74 -- -- 73 -- 96 % --   01/28/25 1007 128/75 98  F (36.7  C) -- 74 16 -- --   01/28/25 1004 128/75 -- -- 71 -- 97 % --   01/28/25 0959 118/78 -- -- 78 -- 97 % --   01/28/25 0926 134/75 -- -- 84 -- 96 % --   01/28/25 0831 (!) 145/80 -- -- 92 -- 97 % --   01/28/25 0816 136/80 -- -- 80 -- 96 % --   01/28/25 0801 (!) 144/89 -- -- 83 -- 95 % --   01/28/25 0753 129/72 -- -- 83 -- 97 % --   01/28/25 0734 -- -- -- -- -- 97 % --   01/28/25 0710 -- 97.7  F (36.5  C) Temporal -- -- -- --   01/28/25 0646 139/80 -- -- 79 -- 96 % --   01/28/25 0631 (!) 142/89 -- -- 90 -- 95 % --   01/28/25 0630 (!) 157/103 -- -- 86 20 95 % --           ED RESULTS:   Results for orders placed or performed during the hospital encounter of 01/28/25 (from the past 24 hours)   Soft tissue neck CT w contrast     Status: None    Collection Time: 01/28/25  7:52 AM    Narrative    EXAM: CT SOFT TISSUE NECK W CONTRAST  LOCATION: [facility]  DATE: 1/28/2025    INDICATION: Swelling, left side of the neck, rule out angioedema versus odontogenic etiology.  COMPARISON: None.  CONTRAST: Isovue 370, 90 mL.  TECHNIQUE: Routine CT Soft Tissue Neck with IV contrast. Multiplanar reformats. Dose reduction techniques were used.    FINDINGS:     MUCOSAL SPACES/SOFT TISSUES: Marked edema in the neck asymmetric towards the left. There is mucosal edema involving the left lateral oropharynx extending inferiorly to the left greater than right hypopharynx. The epiglottis appears thickened and   inflamed. There is effacement of the left piriform sinus. Prevertebral swelling/fluid  collection is present. Inflammation/fat stranding is also seen in the left submandibular space and left greater than right carotid spaces. No rim-enhancing fluid   collection appreciated. No significant inflammation at the floor of the mouth or near the teeth which indicates odontogenic infection is unlikely.     LYMPH NODES: No pathologic lymph nodes by size or morphology criteria.     SALIVARY GLANDS: Normal parotid and submandibular glands.    THYROID: Normal.     VESSELS: Vascular structures of the neck are patent.    VISUALIZED INTRACRANIAL/ORBITS/SINUSES: No abnormality of the visualized intracranial compartment or orbits. Mucosal thickening in the left sphenoid sinus.    OTHER: No destructive osseous lesion. There are degenerative changes in the spine. The included lung apices are clear.      Impression    IMPRESSION:   1.  Marked edema in the neck asymmetric towards the left involving the oropharynx, epiglottis and hypopharynx. There is also inflammation in the left submandibular space and left greater than right carotid spaces. There is also a moderate prevertebral   effusion. This may be due to infectious pharyngitis/epiglottitis. No rim-enhancing fluid collections are appreciated. Angioedema could also be considered. An odontogenic infection is unlikely given the location of inflammation.  2.  No significant enlarged cervical lymph nodes.   CBC with platelets differential     Status: Abnormal    Collection Time: 01/28/25  8:02 AM    Narrative    The following orders were created for panel order CBC with platelets differential.  Procedure                               Abnormality         Status                     ---------                               -----------         ------                     CBC with platelets and d...[[id number]]  Abnormal            Final result                 Please view results for these tests on the individual orders.   Basic metabolic panel     Status: Abnormal    Collection  Time: 01/28/25  8:02 AM   Result Value Ref Range    Sodium 136 135 - 145 mmol/L    Potassium 3.9 3.4 - 5.3 mmol/L    Chloride 102 98 - 107 mmol/L    Carbon Dioxide (CO2) 21 (L) 22 - 29 mmol/L    Anion Gap 13 7 - 15 mmol/L    Urea Nitrogen 14.8 6.0 - 20.0 mg/dL    Creatinine 0.68 0.51 - 0.95 mg/dL    GFR Estimate >90 >60 mL/min/1.73m2    Calcium 8.8 8.8 - 10.4 mg/dL    Glucose 134 (H) 70 - 99 mg/dL   CRP inflammation     Status: Normal    Collection Time: 01/28/25  8:02 AM   Result Value Ref Range    CRP Inflammation <3.00 <5.00 mg/L   CBC with platelets and differential     Status: Abnormal    Collection Time: 01/28/25  8:02 AM   Result Value Ref Range    WBC Count 12.7 (H) 4.0 - 11.0 10e3/uL    RBC Count 3.68 (L) 3.80 - 5.20 10e6/uL    Hemoglobin 11.5 (L) 11.7 - 15.7 g/dL    Hematocrit 32.8 (L) 35.0 - 47.0 %    MCV 89 78 - 100 fL    MCH 31.3 26.5 - 33.0 pg    MCHC 35.1 31.5 - 36.5 g/dL    RDW 12.1 10.0 - 15.0 %    Platelet Count 337 150 - 450 10e3/uL    % Neutrophils 89 %    % Lymphocytes 9 %    % Monocytes 1 %    % Eosinophils 0 %    % Basophils 0 %    % Immature Granulocytes 1 %    NRBCs per 100 WBC 0 <1 /100    Absolute Neutrophils 11.3 (H) 1.6 - 8.3 10e3/uL    Absolute Lymphocytes 1.1 0.8 - 5.3 10e3/uL    Absolute Monocytes 0.2 0.0 - 1.3 10e3/uL    Absolute Eosinophils 0.0 0.0 - 0.7 10e3/uL    Absolute Basophils 0.0 0.0 - 0.2 10e3/uL    Absolute Immature Granulocytes 0.1 <=0.4 10e3/uL    Absolute NRBCs 0.0 10e3/uL   ABO/Rh type and screen     Status: None    Collection Time: 01/28/25  8:02 AM    Narrative    The following orders were created for panel order ABO/Rh type and screen.  Procedure                               Abnormality         Status                     ---------                               -----------         ------                     Adult Type and Screen[[id number]]                            Final result                 Please view results for these tests on the individual orders.   Adult  Type and Screen     Status: None    Collection Time: 01/28/25  8:02 AM   Result Value Ref Range    ABO/RH(D) O POS     Antibody Screen Negative Negative    SPECIMEN EXPIRATION DATE [id number]    Prepare plasma (unit)     Status: None    Collection Time: 01/28/25  9:09 AM   Result Value Ref Range    Blood Component Type Plasma     Product Code L0548Y03     Unit Status Transfused     Unit Number [id number]     CODING SYSTEM [id number]     ISSUE DATE AND TIME 91884408630947     UNIT ABO/RH A+     UNIT TYPE ISBT 6200    Prepare plasma (unit)     Status: None    Collection Time: 01/28/25  9:09 AM   Result Value Ref Range    Blood Component Type Plasma     Product Code K6913K28     Unit Status Transfused     Unit Number [id number]     CODING SYSTEM [id number]     ISSUE DATE AND TIME [id number]     UNIT ABO/RH A+     UNIT TYPE ISBT 6200        ED MEDICATIONS:   Medications   cetirizine (zyrTEC) tablet 10 mg (has no administration in time range)   diltiazem ER COATED BEADS (CARDIZEM CD/CARTIA XT) 24 hr capsule 240 mg (has no administration in time range)   ondansetron (ZOFRAN ODT) ODT tab 4 mg (has no administration in time range)     Or   ondansetron (ZOFRAN) injection 4 mg (has no administration in time range)   prochlorperazine (COMPAZINE) injection 10 mg (has no administration in time range)     Or   prochlorperazine (COMPAZINE) tablet 10 mg (has no administration in time range)   lidocaine 1 % 0.1-1 mL (has no administration in time range)   lidocaine (LMX4) cream (has no administration in time range)   sodium chloride (PF) 0.9% PF flush 3 mL (has no administration in time range)   sodium chloride (PF) 0.9% PF flush 3 mL (has no administration in time range)   EPINEPHrine (ADRENALIN) kit 0.3 mg (has no administration in time range)   iopamidol (ISOVUE-370) solution 500 mL (90 mLs Intravenous $Given 1/28/25 0735)   sodium chloride 0.9 % bag 100mL for CT scan flush use (70 mLs Intravenous $Given 1/28/25 0735)    dexAMETHasone PF (DECADRON) injection 10 mg (10 mg Intravenous $Given 1/28/25 9572)   ampicillin-sulbactam (UNASYN) 3 g vial to attach to  mL bag (0 g Intravenous Stopped 1/28/25 1233)         Impression:    ICD-10-CM    1. Neck swelling  R22.1       2. Angioedema, initial encounter  T78.3XXA                 MD Alyssa Flores Jake, MD  01/28/25 4078

## 2025-01-28 NOTE — PROGRESS NOTES
[facility] Emergency Department  Location: [facility] ([facility])  Diagnosis: Angioedema   Reason for transfer: Need for specialized service or procedure  Clinical details: 53 yo F with PMH of HTN.  She was evaluated in the ED for upper airway swelling that started yesterday with tongue swelling and she was evaluated in the ED and discharged and then she presented again after worsening swelling in her throat and difficulty swallowing.  Per ED provider, no infectious symptoms.  She is on Lisinopril and the dose was increased recently. She was found to have WBC 14 yesterday and today is 12. She had CT scan of the neck which showed evidence for upper airway edema but no drainable lesion.  I was told that ENT evaluated the patient at the bedside and they did not think that this is infectious and did not believe that there is a surgical intervention needed.  She has been treated with steroids, an FFP.  She is not worsening from the airway or respiratory status.  If patient is transferring for specialty care or procedure, the specialist has agreed with need for transfer and anticipated timeline.: Not applicable  ICU Level of Care Recommendation: I recommend an ICU bed at any available ICU.  The patient needs to be monitored in any ICU bed where there is a capacity to manage a potentially difficult airway  ICU Service Recommendation: Other [facility] Facility- Hospitalist  Final destination: pending  Recommendations for referring provider: I recommended to add antibiotics such as Unasyn and also to add infectious workup with strep throat swab.  I recommend to hold Lisinopril.

## 2025-01-28 NOTE — MEDICATION SCRIBE - ADMISSION MEDICATION HISTORY
Medication Scribe Admission Medication History    Admission medication history is complete. The information provided in this note is only as accurate as the sources available at the time of the update.    Information Source(s): Patient and CareEverywhere/SureScripts via in-person    Pertinent Information: inpatient, has not started the three new prescriptions from last night (keflex, zyrtec & prednisone) but has them at home. Verified no use of pain pump, inhalers or prescription eye drops currently.    Changes made to PTA medication list:  Added: None  Deleted: lisinopril/hydrochlorothiazide  Changed: None    Allergies reviewed with patient and updates made in EHR: yes    Medication History Completed By: HAY WILCOX 1/28/2025 5:53 PM    Current Facility-Administered Medications for the 1/28/25 encounter (Hospital Encounter)   Medication    lidocaine 1 % injection 0.5 mL    lidocaine 1 % injection 0.5 mL    lidocaine 1 % injection 0.5 mL    triamcinolone (KENALOG-40) injection 20 mg    triamcinolone (KENALOG-40) injection 20 mg    triamcinolone (KENALOG-40) injection 20 mg     PTA Med List   Medication Sig Last Dose/Taking    cephALEXin (KEFLEX) 500 MG capsule Take 1 capsule (500 mg) by mouth 3 times daily for 7 days. Taking    cetirizine (ZYRTEC) 10 MG tablet Take 1 tablet (10 mg) by mouth daily. Taking    diltiazem ER COATED BEADS (CARDIZEM CD/CARTIA XT) 240 MG 24 hr capsule Take 1 capsule (240 mg) by mouth daily. 1/28/2025 at  6:00 AM    metoprolol succinate ER (TOPROL XL) 100 MG 24 hr tablet Take 1 tablet (100 mg) by mouth daily. (Patient taking differently: Take 100 mg by mouth at bedtime.) 1/27/2025 Bedtime    predniSONE (DELTASONE) 20 MG tablet Take two tablets (= 40mg) each day for 5 (five) days Taking

## 2025-01-29 VITALS
RESPIRATION RATE: 16 BRPM | DIASTOLIC BLOOD PRESSURE: 78 MMHG | OXYGEN SATURATION: 96 % | TEMPERATURE: 98.2 F | HEART RATE: 68 BPM | SYSTOLIC BLOOD PRESSURE: 125 MMHG

## 2025-01-29 LAB
C4 SERPL-MCNC: 27 MG/DL (ref 13–39)
ERYTHROCYTE [DISTWIDTH] IN BLOOD BY AUTOMATED COUNT: 12.7 % (ref 10–15)
HCT VFR BLD AUTO: 31.9 % (ref 35–47)
HGB BLD-MCNC: 10.9 G/DL (ref 11.7–15.7)
HOLD SPECIMEN: NORMAL
MCH RBC QN AUTO: 31 PG (ref 26.5–33)
MCHC RBC AUTO-ENTMCNC: 34.2 G/DL (ref 31.5–36.5)
MCV RBC AUTO: 91 FL (ref 78–100)
PLATELET # BLD AUTO: 349 10E3/UL (ref 150–450)
RBC # BLD AUTO: 3.52 10E6/UL (ref 3.8–5.2)
S PYO DNA THROAT QL NAA+PROBE: NOT DETECTED
WBC # BLD AUTO: 22 10E3/UL (ref 4–11)

## 2025-01-29 PROCEDURE — 87651 STREP A DNA AMP PROBE: CPT | Performed by: PEDIATRICS

## 2025-01-29 PROCEDURE — 99238 HOSP IP/OBS DSCHRG MGMT 30/<: CPT | Performed by: STUDENT IN AN ORGANIZED HEALTH CARE EDUCATION/TRAINING PROGRAM

## 2025-01-29 PROCEDURE — 36415 COLL VENOUS BLD VENIPUNCTURE: CPT | Performed by: PEDIATRICS

## 2025-01-29 PROCEDURE — G0378 HOSPITAL OBSERVATION PER HR: HCPCS

## 2025-01-29 PROCEDURE — 85014 HEMATOCRIT: CPT | Performed by: PEDIATRICS

## 2025-01-29 PROCEDURE — 250N000013 HC RX MED GY IP 250 OP 250 PS 637: Performed by: PEDIATRICS

## 2025-01-29 RX ADMIN — DILTIAZEM HYDROCHLORIDE 240 MG: 120 CAPSULE, EXTENDED RELEASE ORAL at 09:21

## 2025-01-29 RX ADMIN — CETIRIZINE HYDROCHLORIDE 10 MG: 10 TABLET, FILM COATED ORAL at 09:18

## 2025-01-29 ASSESSMENT — ACTIVITIES OF DAILY LIVING (ADL)
ADLS_ACUITY_SCORE: 15

## 2025-01-29 NOTE — SIGNIFICANT EVENT
Significant Event Note    Time of event: 7:15 PM January 28, 2025    Description of event:  Patient reassessed this evening.  She is clinically improved and denies any shortness of breath or difficulty swallowing.  She has been drinking water without any difficulty.  She thinks that her swelling has improved.  Vital signs have been stable.    On exam, there is no obvious swelling of the tongue or epiglottis and the swelling in the right soft palate is now mild, improved from a few hours ago.  No other new oropharyngeal swelling is evident.  Trachea remains midline without stridor.    Plan:  May advance to regular diet  Continue close monitoring overnight  Ordered rapid strep testing and if positive would resume antibiotic therapy (patient received 1 dose IV Unasyn in the ER earlier today)    Discussed with: bedside nurse and patient    Marciano Britt MD

## 2025-01-29 NOTE — DISCHARGE SUMMARY
"[facility]  Hospitalist Discharge Summary      Date of Admission:  1/28/2025  Date of Discharge:  {DISCHARGE DATE:300942}  Discharging Provider: Brianna Ramos MD  Discharge Service: Hospitalist Service    Discharge Diagnoses   ***    Clinically Significant Risk Factors     # Overweight: Estimated body mass index is 25.86 kg/m  (pended) as calculated from the following:    Height as of 1/27/25: 1.651 m (5' 5\").    Weight as of this encounter: (P) 70.5 kg (155 lb 6.4 oz).       Follow-ups Needed After Discharge   Follow-up Appointments       Follow-up and recommended labs and tests       Follow up with primary care provider, Vanesa Thompson, within 7 days for hospital follow- up.            {Additional follow-up instructions/to-do's for PCP    :***}    Unresulted Labs Ordered in the Past 30 Days of this Admission       Date and Time Order Name Status Description    1/28/2025  9:40 AM Blood Culture Arm, Right Preliminary     1/28/2025  8:58 AM Blood Culture Peripheral Blood Preliminary     1/28/2025  6:45 AM Complement C4 In process         These results will be followed up by ***    Discharge Disposition   {Discharge to:8672277::\"Discharged to home\"}  Condition at discharge: {CONDITION:965247::\"Stable\"}    Hospital Course   {OPTIONAL -- use to select A&P section   :399473}    Consultations This Hospital Stay   None    Code Status   Full Code    Time Spent on this Encounter   {How much time did you spend on discharge?:42867209}       Brianna Ramos MD  [facility]  [address] [place] [address]  Phone: [phone]  ______________________________________________________________________    Physical Exam   Vital Signs: Temp: 98.2  F (36.8  C) Temp src: Oral BP: 125/78 Pulse: 68   Resp: 16 SpO2: 96 % O2 Device: Nasal cannula    Weight: 0 lbs 0 oz  {Recommend personal SmartPhrase or Notewriter for exam (OPTIONAL)   :273339}       Primary " Care Physician   Vanesa Thompson    Discharge Orders      Reason for your hospital stay    angioedema of the head and neck     Follow-up and recommended labs and tests     Follow up with primary care provider, Vanesa Thompson, within 7 days for hospital follow- up.     Activity    Your activity upon discharge: activity as tolerated     Diet    Follow this diet upon discharge:       Regular Diet Adult       Significant Results and Procedures   {Data for Discharge Summary:702856}    Discharge Medications   Current Discharge Medication List        CONTINUE these medications which have NOT CHANGED    Details   cetirizine (ZYRTEC) 10 MG tablet Take 1 tablet (10 mg) by mouth daily.  Qty: 30 tablet, Refills: 0      diltiazem ER COATED BEADS (CARDIZEM CD/CARTIA XT) 240 MG 24 hr capsule Take 1 capsule (240 mg) by mouth daily.  Qty: 90 capsule, Refills: 4    Associated Diagnoses: Essential hypertension      metoprolol succinate ER (TOPROL XL) 100 MG 24 hr tablet Take 1 tablet (100 mg) by mouth daily.  Qty: 90 tablet, Refills: 4    Associated Diagnoses: Essential hypertension      predniSONE (DELTASONE) 20 MG tablet Take two tablets (= 40mg) each day for 5 (five) days  Qty: 10 tablet, Refills: 0           STOP taking these medications       cephALEXin (KEFLEX) 500 MG capsule Comments:   Reason for Stopping:             Allergies   Allergies   Allergen Reactions    Ace Inhibitors Angioedema            Significant Results and Procedures   Results for orders placed or performed during the hospital encounter of 01/28/25   Soft tissue neck CT w contrast    Narrative    EXAM: CT SOFT TISSUE NECK W CONTRAST  LOCATION: [facility]  DATE: 1/28/2025    INDICATION: Swelling, left side of the neck, rule out angioedema versus odontogenic etiology.  COMPARISON: None.  CONTRAST: Isovue 370, 90 mL.  TECHNIQUE: Routine CT Soft Tissue Neck with IV contrast. Multiplanar reformats. Dose reduction techniques were used.    FINDINGS:     MUCOSAL SPACES/SOFT TISSUES: Marked edema in the neck asymmetric towards the left. There is mucosal edema involving the left lateral oropharynx extending inferiorly to the left greater than right hypopharynx. The epiglottis appears thickened and   inflamed. There is effacement of the left piriform sinus. Prevertebral swelling/fluid collection is present. Inflammation/fat stranding is also seen in the left submandibular space and left greater than right carotid spaces. No rim-enhancing fluid   collection appreciated. No significant inflammation at the floor of the mouth or near the teeth which indicates odontogenic infection is unlikely.     LYMPH NODES: No pathologic lymph nodes by size or morphology criteria.     SALIVARY GLANDS: Normal parotid and submandibular glands.    THYROID: Normal.     VESSELS: Vascular structures of the neck are patent.    VISUALIZED INTRACRANIAL/ORBITS/SINUSES: No abnormality of the visualized intracranial compartment or orbits. Mucosal thickening in the left sphenoid sinus.    OTHER: No destructive osseous lesion. There are degenerative changes in the spine. The included lung apices are clear.      Impression    IMPRESSION:   1.  Marked edema in the neck asymmetric towards the left involving the oropharynx, epiglottis and hypopharynx. There is also inflammation in the left submandibular space and left greater than right carotid spaces.  There is also a moderate prevertebral   effusion. This may be due to infectious pharyngitis/epiglottitis. No rim-enhancing fluid collections are appreciated. Angioedema could also be considered. An odontogenic infection is unlikely given the location of inflammation.  2.  No significant enlarged cervical lymph nodes.       Discharge Medications   Discharge Medication List as of 1/29/2025 10:38 AM        CONTINUE these medications which have NOT CHANGED    Details   cetirizine (ZYRTEC) 10 MG tablet Take 1 tablet (10 mg) by mouth daily., Disp-30 tablet, R-0, E-Prescribe      diltiazem ER COATED BEADS (CARDIZEM CD/CARTIA XT) 240 MG 24 hr capsule Take 1 capsule (240 mg) by mouth daily., Disp-90 capsule, R-4, E-Prescribe      metoprolol succinate ER (TOPROL XL) 100 MG 24 hr tablet Take 1 tablet (100 mg) by mouth daily., Disp-90 tablet, R-4, E-Prescribe      predniSONE (DELTASONE) 20 MG tablet Take two tablets (= 40mg) each day for 5 (five) days, Disp-10 tablet, R-0, E-Prescribe           STOP taking these medications       cephALEXin (KEFLEX) 500 MG capsule Comments:   Reason for Stopping:             Allergies   Allergies   Allergen Reactions    Ace Inhibitors Angioedema

## 2025-01-29 NOTE — PROGRESS NOTES
Pt states that she feels much better this morning, there is no obvious swelling, no complaints of throat pain or discomfort, she has been able to sleep, vitals have been stable. /86   Pulse 78   Temp 98  F (36.7  C) (Oral)   Resp 16   Wt (P) 70.5 kg (155 lb 6.4 oz)   LMP 09/11/2013   SpO2 95%   BMI (P) 25.86 kg/m    Telemetry shows sinus rhythm.

## 2025-01-30 LAB
BACTERIA BLD CULT: NORMAL
BACTERIA BLD CULT: NORMAL

## 2025-02-02 LAB
BACTERIA BLD CULT: NO GROWTH
BACTERIA BLD CULT: NO GROWTH

## 2025-02-11 ENCOUNTER — OFFICE VISIT (OUTPATIENT)
Dept: FAMILY MEDICINE | Facility: OTHER | Age: 55
End: 2025-02-11
Payer: COMMERCIAL

## 2025-02-11 VITALS
WEIGHT: 161.5 LBS | DIASTOLIC BLOOD PRESSURE: 88 MMHG | BODY MASS INDEX: 26.88 KG/M2 | HEART RATE: 89 BPM | TEMPERATURE: 97.6 F | OXYGEN SATURATION: 98 % | SYSTOLIC BLOOD PRESSURE: 148 MMHG

## 2025-02-11 DIAGNOSIS — I10 ESSENTIAL HYPERTENSION: Primary | ICD-10-CM

## 2025-02-11 LAB
ERYTHROCYTE [DISTWIDTH] IN BLOOD BY AUTOMATED COUNT: 13 % (ref 10–15)
HCT VFR BLD AUTO: 33.5 % (ref 35–47)
HGB BLD-MCNC: 11.3 G/DL (ref 11.7–15.7)
MCH RBC QN AUTO: 31.4 PG (ref 26.5–33)
MCHC RBC AUTO-ENTMCNC: 33.7 G/DL (ref 31.5–36.5)
MCV RBC AUTO: 93 FL (ref 78–100)
PLATELET # BLD AUTO: 327 10E3/UL (ref 150–450)
RBC # BLD AUTO: 3.6 10E6/UL (ref 3.8–5.2)
WBC # BLD AUTO: 11 10E3/UL (ref 4–11)

## 2025-02-11 PROCEDURE — 36415 COLL VENOUS BLD VENIPUNCTURE: CPT

## 2025-02-11 PROCEDURE — 85027 COMPLETE CBC AUTOMATED: CPT

## 2025-02-11 RX ORDER — HYDROCHLOROTHIAZIDE 25 MG/1
25 TABLET ORAL DAILY
Qty: 90 TABLET | Refills: 1 | Status: SHIPPED | OUTPATIENT
Start: 2025-02-11

## 2025-02-11 RX ORDER — HYDROXYZINE HYDROCHLORIDE 25 MG/1
TABLET, FILM COATED ORAL
COMMUNITY

## 2025-02-11 ASSESSMENT — PAIN SCALES - GENERAL: PAINLEVEL_OUTOF10: NO PAIN (0)

## 2025-02-11 NOTE — PROGRESS NOTES
"  {PROVIDER CHARTING PREFERENCE:609730}    Subjective   Lisa is a 54 year old, presenting for the following health issues:  Hospital Follow up   {(!) Visit Details have not yet been documented.  Please enter Visit Details and then use this list to pull in documentation. (Optional):593728}  [facility]       Hospital Follow-up Visit:    Hospital/Nursing Home/IP Rehab Facility: [facility]  Patient went in first on 01/27/2025  Date of Admission: 1/28/2025  Date of Discharge: 01/29/2025  Reason(s) for Admission: Angioedema  Was the patient in the ICU or did the patient experience delirium during hospitalization?  Yes  Do you have any other stressors you would like to discuss with your provider? No    Problems taking medications regularly:  None  Medication changes since discharge: continue Diltiazem and Metroprolol and stop taking lisinopril and for now not starting hydrochlorothiazide  Problems adhering to non-medication therapy:  None    Summary of hospitalization:  {:251706}  Diagnostic Tests/Treatments reviewed.  Follow up needed: {:126395:}  Other Healthcare Providers Involved in Patient s Care:         {those currently involved after discharge:204915::\"None\"}  Update since discharge: {:430792} {TIP  Include information from family/caregivers, SNF, Care Coordination :528709}        Plan of care communicated with {:189589}     {Reference  Coding guidelines- Moderate Complexity F2F/Video within 7 - 14 days of discharge 7459977, High Complexity F2F/Video within 7 days 6459754 or bivufm67 days 2561431 :485800}      {additonal problems for provider to add (Optional):127582}    {ROS Picklists (Optional):402661}      Objective    BP (!) 148/88   Pulse 89   Temp 97.6  F (36.4  C) (Temporal)   Wt 73.3 kg (161 lb 8 oz)   LMP 09/11/2013   SpO2 98%   BMI 26.88 kg/m    Body mass index is 26.88 kg/m .  Physical Exam  Constitutional:       General: She is not in acute distress.     Appearance: Normal " appearance. She is not ill-appearing.   Cardiovascular:      Rate and Rhythm: Normal rate and regular rhythm.   Pulmonary:      Effort: Pulmonary effort is normal.      Breath sounds: Normal breath sounds.   Musculoskeletal:      Cervical back: Normal range of motion. No rigidity.   Skin:     General: Skin is warm and dry.   Neurological:      General: No focal deficit present.      Mental Status: She is alert and oriented to person, place, and time.   Psychiatric:         Mood and Affect: Mood normal.         Behavior: Behavior normal.              Signed Electronically by: Josesito Guerra DO  {Email feedback regarding this note to [email or web address]   :980898}

## 2025-03-26 ENCOUNTER — ALLIED HEALTH/NURSE VISIT (OUTPATIENT)
Dept: FAMILY MEDICINE | Facility: OTHER | Age: 55
End: 2025-03-26
Payer: COMMERCIAL

## 2025-03-26 VITALS — DIASTOLIC BLOOD PRESSURE: 76 MMHG | SYSTOLIC BLOOD PRESSURE: 146 MMHG

## 2025-03-26 DIAGNOSIS — I10 ESSENTIAL HYPERTENSION: Primary | ICD-10-CM

## 2025-03-26 PROCEDURE — 3078F DIAST BP <80 MM HG: CPT

## 2025-03-26 PROCEDURE — 3077F SYST BP >= 140 MM HG: CPT

## 2025-03-26 PROCEDURE — 99207 PR NO CHARGE LOS: CPT

## 2025-03-26 NOTE — PROGRESS NOTES
Lisa Urbina is a 54 year old patient who comes in today for a Blood Pressure check with a Medical Assistant because of medication change.  Initial BP:  BP (!) 146/76   LMP 09/11/2013      Blood pressure is high.   Repeat Blood pressure: YES  Patient is taking medication as prescribed.  Patient is tolerating medications well.  Is patient taking blood pressures at home? No  Current Symptoms: none    Disposition:   Abnormal Blood Pressure yes  Yes: Huddled with: RN due to abnormal result. Create a telephone encounter copy this note and route to requesting provider/PCP.  No: CC this chart to requesting provider/PCP.

## 2025-04-09 ENCOUNTER — OFFICE VISIT (OUTPATIENT)
Dept: FAMILY MEDICINE | Facility: OTHER | Age: 55
End: 2025-04-09
Payer: COMMERCIAL

## 2025-04-09 VITALS
HEART RATE: 80 BPM | OXYGEN SATURATION: 97 % | SYSTOLIC BLOOD PRESSURE: 144 MMHG | TEMPERATURE: 98.4 F | DIASTOLIC BLOOD PRESSURE: 81 MMHG | RESPIRATION RATE: 17 BRPM

## 2025-04-09 DIAGNOSIS — R22.1 NECK SWELLING: ICD-10-CM

## 2025-04-09 DIAGNOSIS — Z87.898 HX OF ANGIOEDEMA: ICD-10-CM

## 2025-04-09 DIAGNOSIS — I10 ESSENTIAL HYPERTENSION: Primary | ICD-10-CM

## 2025-04-09 LAB
ANION GAP SERPL CALCULATED.3IONS-SCNC: 8 MMOL/L (ref 7–15)
BUN SERPL-MCNC: 12.2 MG/DL (ref 6–20)
CALCIUM SERPL-MCNC: 10 MG/DL (ref 8.8–10.4)
CHLORIDE SERPL-SCNC: 102 MMOL/L (ref 98–107)
CREAT SERPL-MCNC: 0.69 MG/DL (ref 0.51–0.95)
EGFRCR SERPLBLD CKD-EPI 2021: >90 ML/MIN/1.73M2
GLUCOSE SERPL-MCNC: 105 MG/DL (ref 70–99)
HCO3 SERPL-SCNC: 30 MMOL/L (ref 22–29)
POTASSIUM SERPL-SCNC: 3.3 MMOL/L (ref 3.4–5.3)
SODIUM SERPL-SCNC: 140 MMOL/L (ref 135–145)

## 2025-04-09 PROCEDURE — 36415 COLL VENOUS BLD VENIPUNCTURE: CPT

## 2025-04-09 PROCEDURE — 1126F AMNT PAIN NOTED NONE PRSNT: CPT

## 2025-04-09 PROCEDURE — 80048 BASIC METABOLIC PNL TOTAL CA: CPT

## 2025-04-09 PROCEDURE — 99213 OFFICE O/P EST LOW 20 MIN: CPT

## 2025-04-09 PROCEDURE — 3079F DIAST BP 80-89 MM HG: CPT

## 2025-04-09 PROCEDURE — 3077F SYST BP >= 140 MM HG: CPT

## 2025-04-09 RX ORDER — LOSARTAN POTASSIUM 25 MG/1
25 TABLET ORAL DAILY
Qty: 30 TABLET | Refills: 1 | Status: SHIPPED | OUTPATIENT
Start: 2025-04-09 | End: 2025-04-09

## 2025-04-09 RX ORDER — HYDROCHLOROTHIAZIDE 25 MG/1
50 TABLET ORAL DAILY
Status: CANCELLED | OUTPATIENT
Start: 2025-04-09

## 2025-04-09 RX ORDER — HYDROCHLOROTHIAZIDE 50 MG/1
50 TABLET ORAL DAILY
Qty: 90 TABLET | Refills: 1 | Status: SHIPPED | OUTPATIENT
Start: 2025-04-09

## 2025-04-09 RX ORDER — HYDROCHLOROTHIAZIDE 12.5 MG/1
12.5 TABLET ORAL DAILY
Status: CANCELLED | OUTPATIENT
Start: 2025-04-09

## 2025-04-09 ASSESSMENT — PAIN SCALES - GENERAL: PAINLEVEL_OUTOF10: NO PAIN (0)

## 2025-04-09 NOTE — PROGRESS NOTES
{PROVIDER CHARTING PREFERENCE:953346}    Brian Gonzalez is a 54 year old, presenting for the following health issues:  Hypertension      4/9/2025     3:15 PM   Additional Questions   Roomed by erickson   Accompanied by self     History of Present Illness       Hypertension: She presents for follow up of hypertension.  She does not check blood pressure  regularly outside of the clinic. Outside blood pressures have been over 140/90. She does not follow a low salt diet.     Reason for visit:  F/u bp and neck/throat symptoms    She eats 0-1 servings of fruits and vegetables daily.She consumes 0 sweetened beverage(s) daily.She exercises with enough effort to increase her heart rate 9 or less minutes per day.  She exercises with enough effort to increase her heart rate 3 or less days per week.   She is taking medications regularly.        {MA/LPN/RN Pre-Provider Visit Orders- hCG/UA/Strep (Optional):568461}  {SUPERLIST (Optional):019371}  {additonal problems for provider to add (Optional):586029}    {ROS Picklists (Optional):675369}      Objective    BP (!) 152/76   Pulse 80   Temp 98.4  F (36.9  C) (Temporal)   Resp 17   LMP 09/11/2013   SpO2 97%   There is no height or weight on file to calculate BMI.  Physical Exam   {Exam List (Optional):127148}    {Diagnostic Test Results (Optional):708233}        Signed Electronically by: Josesito Guerra DO  {Email feedback regarding this note to [email or web address]   :051894}   General: She is not in acute distress.     Appearance: Normal appearance. She is not ill-appearing.   HENT:      Head: Normocephalic.   Cardiovascular:      Rate and Rhythm: Normal rate and regular rhythm.   Pulmonary:      Effort: Pulmonary effort is normal.      Breath sounds: Normal breath sounds.   Skin:     General: Skin is warm and dry.   Neurological:      General: No focal deficit present.      Mental Status: She is alert and oriented to person, place, and time.   Psychiatric:         Mood and Affect: Mood normal.         Behavior: Behavior normal.                    Signed Electronically by: Josesito Guerra DO

## 2025-04-15 RX ORDER — SPIRONOLACTONE 25 MG/1
12.5 TABLET ORAL DAILY
Qty: 30 TABLET | Refills: 1 | Status: SHIPPED | OUTPATIENT
Start: 2025-04-15

## 2025-05-07 ENCOUNTER — HOSPITAL ENCOUNTER (OUTPATIENT)
Dept: CT IMAGING | Facility: CLINIC | Age: 55
Discharge: HOME | End: 2025-05-07
Payer: COMMERCIAL

## 2025-05-07 DIAGNOSIS — R22.1 NECK SWELLING: ICD-10-CM

## 2025-05-07 DIAGNOSIS — Z87.898 HX OF ANGIOEDEMA: ICD-10-CM

## 2025-05-07 PROCEDURE — 250N000011 HC RX IP 250 OP 636

## 2025-05-07 PROCEDURE — 250N000009 HC RX 250

## 2025-05-07 PROCEDURE — 70491 CT SOFT TISSUE NECK W/DYE: CPT

## 2025-05-07 RX ORDER — IOPAMIDOL 755 MG/ML
500 INJECTION, SOLUTION INTRAVASCULAR ONCE
Status: COMPLETED | OUTPATIENT
Start: 2025-05-07 | End: 2025-05-07

## 2025-05-07 RX ADMIN — IOPAMIDOL 90 ML: 755 INJECTION, SOLUTION INTRAVENOUS at 13:29

## 2025-05-07 RX ADMIN — SODIUM CHLORIDE 70 ML: 9 INJECTION, SOLUTION INTRAVENOUS at 13:28

## 2025-05-08 ENCOUNTER — RESULTS FOLLOW-UP (OUTPATIENT)
Dept: FAMILY MEDICINE | Facility: OTHER | Age: 55
End: 2025-05-08

## 2025-05-28 ENCOUNTER — OFFICE VISIT (OUTPATIENT)
Dept: OTOLARYNGOLOGY | Facility: OTHER | Age: 55
End: 2025-05-28
Payer: COMMERCIAL

## 2025-05-28 VITALS
BODY MASS INDEX: 25.99 KG/M2 | DIASTOLIC BLOOD PRESSURE: 82 MMHG | HEIGHT: 65 IN | WEIGHT: 156 LBS | TEMPERATURE: 97 F | SYSTOLIC BLOOD PRESSURE: 122 MMHG

## 2025-05-28 DIAGNOSIS — K21.9 LPRD (LARYNGOPHARYNGEAL REFLUX DISEASE): Primary | ICD-10-CM

## 2025-05-28 DIAGNOSIS — R09.A2 GLOBUS PHARYNGEUS: ICD-10-CM

## 2025-05-28 RX ORDER — OMEPRAZOLE 40 MG/1
40 CAPSULE, DELAYED RELEASE ORAL
Qty: 60 CAPSULE | Refills: 2 | Status: SHIPPED | OUTPATIENT
Start: 2025-05-28

## 2025-05-28 ASSESSMENT — PAIN SCALES - GENERAL: PAINLEVEL_OUTOF10: NO PAIN (0)
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
## 2025-08-06 DIAGNOSIS — I10 ESSENTIAL HYPERTENSION: ICD-10-CM

## 2025-08-06 RX ORDER — SPIRONOLACTONE 25 MG/1
12.5 TABLET ORAL DAILY
Qty: 45 TABLET | Refills: 1 | Status: SHIPPED | OUTPATIENT
Start: 2025-08-06

## 2025-08-19 ENCOUNTER — PATIENT OUTREACH (OUTPATIENT)
Dept: CARE COORDINATION | Facility: CLINIC | Age: 55
End: 2025-08-19
Payer: COMMERCIAL

## 2025-09-03 ENCOUNTER — OFFICE VISIT (OUTPATIENT)
Dept: OTOLARYNGOLOGY | Facility: OTHER | Age: 55
End: 2025-09-03
Payer: COMMERCIAL

## 2025-09-03 ENCOUNTER — OFFICE VISIT (OUTPATIENT)
Dept: FAMILY MEDICINE | Facility: OTHER | Age: 55
End: 2025-09-03
Payer: COMMERCIAL

## 2025-09-03 VITALS
BODY MASS INDEX: 25.72 KG/M2 | DIASTOLIC BLOOD PRESSURE: 78 MMHG | SYSTOLIC BLOOD PRESSURE: 118 MMHG | WEIGHT: 154 LBS | TEMPERATURE: 97.1 F

## 2025-09-03 VITALS
BODY MASS INDEX: 25.66 KG/M2 | TEMPERATURE: 98.2 F | HEART RATE: 84 BPM | DIASTOLIC BLOOD PRESSURE: 86 MMHG | WEIGHT: 154 LBS | RESPIRATION RATE: 19 BRPM | SYSTOLIC BLOOD PRESSURE: 160 MMHG | OXYGEN SATURATION: 99 % | HEIGHT: 65 IN

## 2025-09-03 DIAGNOSIS — K21.9 LPRD (LARYNGOPHARYNGEAL REFLUX DISEASE): Primary | ICD-10-CM

## 2025-09-03 DIAGNOSIS — I49.3 FREQUENT PVCS: Primary | ICD-10-CM

## 2025-09-03 DIAGNOSIS — R09.A2 GLOBUS PHARYNGEUS: ICD-10-CM

## 2025-09-03 DIAGNOSIS — I10 HYPERTENSION, UNSPECIFIED TYPE: ICD-10-CM

## 2025-09-03 LAB
ALBUMIN SERPL BCG-MCNC: 4.6 G/DL (ref 3.5–5.2)
ALP SERPL-CCNC: 129 U/L (ref 40–150)
ALT SERPL W P-5'-P-CCNC: 27 U/L (ref 0–50)
ANION GAP SERPL CALCULATED.3IONS-SCNC: 17 MMOL/L (ref 7–15)
AST SERPL W P-5'-P-CCNC: 29 U/L (ref 0–45)
BILIRUB SERPL-MCNC: 0.2 MG/DL
BUN SERPL-MCNC: 9.9 MG/DL (ref 6–20)
CALCIUM SERPL-MCNC: 9.5 MG/DL (ref 8.8–10.4)
CHLORIDE SERPL-SCNC: 97 MMOL/L (ref 98–107)
CREAT SERPL-MCNC: 0.63 MG/DL (ref 0.51–0.95)
EGFRCR SERPLBLD CKD-EPI 2021: >90 ML/MIN/1.73M2
ERYTHROCYTE [DISTWIDTH] IN BLOOD BY AUTOMATED COUNT: 12 % (ref 10–15)
GLUCOSE SERPL-MCNC: 167 MG/DL (ref 70–99)
HCO3 SERPL-SCNC: 24 MMOL/L (ref 22–29)
HCT VFR BLD AUTO: 36.6 % (ref 35–47)
HGB BLD-MCNC: 12.7 G/DL (ref 11.7–15.7)
MCH RBC QN AUTO: 31.4 PG (ref 26.5–33)
MCHC RBC AUTO-ENTMCNC: 34.7 G/DL (ref 31.5–36.5)
MCV RBC AUTO: 90.4 FL (ref 78–100)
PLATELET # BLD AUTO: 315 10E3/UL (ref 150–450)
POTASSIUM SERPL-SCNC: 2.9 MMOL/L (ref 3.4–5.3)
PROT SERPL-MCNC: 7.6 G/DL (ref 6.4–8.3)
RBC # BLD AUTO: 4.05 10E6/UL (ref 3.8–5.2)
SODIUM SERPL-SCNC: 138 MMOL/L (ref 135–145)
WBC # BLD AUTO: 9.73 10E3/UL (ref 4–11)

## 2025-09-03 PROCEDURE — 99213 OFFICE O/P EST LOW 20 MIN: CPT | Performed by: OTOLARYNGOLOGY

## 2025-09-03 PROCEDURE — 1126F AMNT PAIN NOTED NONE PRSNT: CPT | Performed by: OTOLARYNGOLOGY

## 2025-09-03 PROCEDURE — 36415 COLL VENOUS BLD VENIPUNCTURE: CPT

## 2025-09-03 PROCEDURE — 3074F SYST BP LT 130 MM HG: CPT | Performed by: OTOLARYNGOLOGY

## 2025-09-03 PROCEDURE — 80053 COMPREHEN METABOLIC PANEL: CPT

## 2025-09-03 PROCEDURE — 3078F DIAST BP <80 MM HG: CPT | Performed by: OTOLARYNGOLOGY

## 2025-09-03 PROCEDURE — 85027 COMPLETE CBC AUTOMATED: CPT

## 2025-09-03 RX ORDER — DILTIAZEM HYDROCHLORIDE 300 MG/1
300 CAPSULE, COATED, EXTENDED RELEASE ORAL DAILY
Qty: 90 CAPSULE | Refills: 1 | Status: SHIPPED | OUTPATIENT
Start: 2025-09-03

## 2025-09-03 RX ORDER — OMEPRAZOLE 40 MG/1
40 CAPSULE, DELAYED RELEASE ORAL DAILY
Qty: 60 CAPSULE | Refills: 2 | Status: SHIPPED | OUTPATIENT
Start: 2025-09-03

## 2025-09-03 ASSESSMENT — PAIN SCALES - GENERAL
PAINLEVEL_OUTOF10: NO PAIN (0)
PAINLEVEL_OUTOF10: NO PAIN (0)